# Patient Record
Sex: FEMALE | Race: BLACK OR AFRICAN AMERICAN | NOT HISPANIC OR LATINO | Employment: UNEMPLOYED | URBAN - METROPOLITAN AREA
[De-identification: names, ages, dates, MRNs, and addresses within clinical notes are randomized per-mention and may not be internally consistent; named-entity substitution may affect disease eponyms.]

---

## 2017-02-17 ENCOUNTER — OFFICE VISIT - HEALTHEAST (OUTPATIENT)
Dept: FAMILY MEDICINE | Facility: CLINIC | Age: 47
End: 2017-02-17

## 2017-02-17 DIAGNOSIS — R70.0 ELEVATED SED RATE: ICD-10-CM

## 2017-02-17 DIAGNOSIS — M79.10 MYALGIA: ICD-10-CM

## 2017-02-17 DIAGNOSIS — E55.9 VITAMIN D DEFICIENCY: ICD-10-CM

## 2017-02-17 DIAGNOSIS — D50.9 IRON DEFICIENCY ANEMIA: ICD-10-CM

## 2017-02-17 ASSESSMENT — MIFFLIN-ST. JEOR: SCORE: 1386.55

## 2017-02-21 LAB — ANA SER QL: 0.7 U

## 2017-02-23 ENCOUNTER — AMBULATORY - HEALTHEAST (OUTPATIENT)
Dept: FAMILY MEDICINE | Facility: CLINIC | Age: 47
End: 2017-02-23

## 2017-02-23 DIAGNOSIS — E55.9 VITAMIN D DEFICIENCY: ICD-10-CM

## 2017-04-18 ENCOUNTER — OFFICE VISIT - HEALTHEAST (OUTPATIENT)
Dept: FAMILY MEDICINE | Facility: CLINIC | Age: 47
End: 2017-04-18

## 2017-04-18 DIAGNOSIS — M54.2 NECK PAIN: ICD-10-CM

## 2017-04-18 DIAGNOSIS — E55.9 VITAMIN D DEFICIENCY: ICD-10-CM

## 2017-04-18 DIAGNOSIS — D50.9 IRON DEFICIENCY ANEMIA: ICD-10-CM

## 2017-04-18 DIAGNOSIS — M35.3 POLYMYALGIA (H): ICD-10-CM

## 2017-04-18 DIAGNOSIS — G44.209 ACUTE NON INTRACTABLE TENSION-TYPE HEADACHE: ICD-10-CM

## 2017-04-26 ENCOUNTER — COMMUNICATION - HEALTHEAST (OUTPATIENT)
Dept: FAMILY MEDICINE | Facility: CLINIC | Age: 47
End: 2017-04-26

## 2017-04-26 DIAGNOSIS — E55.9 VITAMIN D DEFICIENCY: ICD-10-CM

## 2017-04-27 ENCOUNTER — COMMUNICATION - HEALTHEAST (OUTPATIENT)
Dept: LAB | Facility: CLINIC | Age: 47
End: 2017-04-27

## 2017-04-28 ENCOUNTER — AMBULATORY - HEALTHEAST (OUTPATIENT)
Dept: FAMILY MEDICINE | Facility: CLINIC | Age: 47
End: 2017-04-28

## 2017-04-28 ENCOUNTER — AMBULATORY - HEALTHEAST (OUTPATIENT)
Dept: LAB | Facility: CLINIC | Age: 47
End: 2017-04-28

## 2017-04-28 DIAGNOSIS — E55.9 VITAMIN D DEFICIENCY: ICD-10-CM

## 2017-06-07 DIAGNOSIS — M25.532 LEFT WRIST PAIN: ICD-10-CM

## 2017-06-07 NOTE — TELEPHONE ENCOUNTER
Request for medication refill:    Date of last visit at clinic: 12/23/2015    Please complete refill if appropriate and CLOSE ENCOUNTER.    Closing the encounter signifies the refill is complete.    If refill has been denied, please complete the smart phrase .smirefuse and route it to the [facility] RN TRIAGE pool to inform the patient and the pharmacy.    Malka Rust MA

## 2017-06-13 DIAGNOSIS — K21.9 GASTROESOPHAGEAL REFLUX DISEASE WITHOUT ESOPHAGITIS: ICD-10-CM

## 2017-06-13 RX ORDER — OMEPRAZOLE 40 MG/1
40 CAPSULE, DELAYED RELEASE ORAL DAILY
Qty: 30 CAPSULE | Refills: 0 | Status: SHIPPED | OUTPATIENT
Start: 2017-06-13 | End: 2022-09-30

## 2017-06-13 NOTE — TELEPHONE ENCOUNTER
Request for medication refill:    Date of last visit at clinic: 12/18/15    Please complete refill if appropriate and CLOSE ENCOUNTER.    Closing the encounter signifies the refill is complete.    If refill has been denied, please complete the smart phrase .smirefuse and route it to the [facility] RN TRIAGE pool to inform the patient and the pharmacy.    Lizette Lamas CMA      12/18/15

## 2017-06-14 ENCOUNTER — COMMUNICATION - HEALTHEAST (OUTPATIENT)
Dept: FAMILY MEDICINE | Facility: CLINIC | Age: 47
End: 2017-06-14

## 2017-06-14 DIAGNOSIS — K21.9 GASTROESOPHAGEAL REFLUX DISEASE WITHOUT ESOPHAGITIS: ICD-10-CM

## 2017-07-21 ENCOUNTER — COMMUNICATION - HEALTHEAST (OUTPATIENT)
Dept: SCHEDULING | Facility: CLINIC | Age: 47
End: 2017-07-21

## 2017-07-21 ENCOUNTER — OFFICE VISIT - HEALTHEAST (OUTPATIENT)
Dept: FAMILY MEDICINE | Facility: CLINIC | Age: 47
End: 2017-07-21

## 2017-07-21 DIAGNOSIS — K21.9 GASTROESOPHAGEAL REFLUX DISEASE WITHOUT ESOPHAGITIS: ICD-10-CM

## 2017-07-21 DIAGNOSIS — M54.9 BACK PAIN: ICD-10-CM

## 2017-07-21 DIAGNOSIS — N39.0 UTI (URINARY TRACT INFECTION): ICD-10-CM

## 2017-12-22 ENCOUNTER — OFFICE VISIT (OUTPATIENT)
Dept: FAMILY MEDICINE | Facility: CLINIC | Age: 47
End: 2017-12-22
Payer: COMMERCIAL

## 2017-12-22 VITALS
SYSTOLIC BLOOD PRESSURE: 102 MMHG | TEMPERATURE: 97.6 F | WEIGHT: 159 LBS | DIASTOLIC BLOOD PRESSURE: 60 MMHG | RESPIRATION RATE: 18 BRPM | HEART RATE: 50 BPM | OXYGEN SATURATION: 100 % | HEIGHT: 62 IN | BODY MASS INDEX: 29.26 KG/M2

## 2017-12-22 DIAGNOSIS — E78.5 HYPERLIPIDEMIA LDL GOAL <100: ICD-10-CM

## 2017-12-22 DIAGNOSIS — D22.9 NEVUS: ICD-10-CM

## 2017-12-22 DIAGNOSIS — E55.9 VITAMIN D DEFICIENCY: Primary | ICD-10-CM

## 2017-12-22 DIAGNOSIS — R63.4 LOSS OF WEIGHT: ICD-10-CM

## 2017-12-22 LAB
CHOLEST SERPL-MCNC: 173.2 MG/DL (ref 0–200)
CHOLEST/HDLC SERPL: 3.2 {RATIO} (ref 0–5)
HDLC SERPL-MCNC: 54.9 MG/DL
HEMOGLOBIN: 13.5 G/DL (ref 11.7–15.7)
LDLC SERPL CALC-MCNC: 110 MG/DL (ref 0–129)
TRIGL SERPL-MCNC: 41.5 MG/DL (ref 0–150)
VLDL CHOLESTEROL: 8.3 MG/DL (ref 7–32)

## 2017-12-22 RX ORDER — MULTIVITAMIN,THERAPEUTIC
1 TABLET ORAL DAILY
COMMUNITY
End: 2019-04-19

## 2017-12-22 NOTE — LETTER
December 24, 2017      Ana Cristina Mc  [address]   [facility] [address]        Dear Ana Cristina,    Thank you for getting your care at [facility]. Please see below for your test results.    Resulted Orders   Hemoglobin (HGB) (LabDAQ)   Result Value Ref Range    Hemoglobin 13.5 11.7 - 15.7 g/dL   Vitamin D Deficiency   Result Value Ref Range    Vitamin D Deficiency screening 14 (L) 20 - 75 ug/L      Comment:      Season, race, dietary intake, and treatment affect the concentration of   25-hydroxy-Vitamin D. Values may decrease during winter months and increase   during summer months. Values 20-29 ug/L may indicate Vitamin D insufficiency   and values <20 ug/L may indicate Vitamin D deficiency.  Vitamin D determination is routinely performed by an immunoassay specific for   25 hydroxyvitamin D3.  If an individual is on vitamin D2 (ergocalciferol)   supplementation, please specify 25 OH vitamin D2 and D3 level determination by   LCMSMS test VITD23.     Lipid Panel (LabDAQ)   Result Value Ref Range    Cholesterol 173.2 0.0 - 200.0 mg/dL    Cholesterol/HDL Ratio 3.2 0.0 - 5.0    HDL Cholesterol 54.9 >40.0 mg/dL    LDL Cholesterol Calculated 110 0 - 129 mg/dL    Triglycerides 41.5 0.0 - 150.0 mg/dL    VLDL Cholesterol 8.3 7.0 - 32.0 mg/dL     Your blood tests are good except your vitamin D level is low. I will send a prescription to your pharmacy for high dose vitamin D to take once a month for 4 months.    Sincerely,    Molly Garcia MD

## 2017-12-22 NOTE — PATIENT INSTRUCTIONS
[facility] [place], [place]  Address: [address], [place] [address]  Phone: [phone]

## 2017-12-22 NOTE — PROGRESS NOTES
"SUBJECTIVE:  The patient is here with 2 main issues.  One is that she has had a lesion on her nose for quite a few years; it has not changed specifically, but it is bothering her as far as often will get scratched.  She is interested in having that removed.  She also has ended up losing a lot of weight on purpose by getting regular exercise and eating better, but she is worried about whether she might have some problems with her vitamin levels.  She has been low on vitamin D in the past.  She is wondering about her iron levels, too.  She has had a history of hyperlipidemia.  She is not fasting, but I told her we can check that now even though she is not fasting.  She is also wondering what a healthy weight would be.      OBJECTIVE:  On exam, the patient is in no acute distress.  Vital signs are stable.  She does have a raised, almost papular-looking lesion on her nose, uniform color.  She has lost an amazing amount of weight on our records over the last 10 years or so.  The heights were all over the place, so we got an accurate height.  She seems to be 5'1\", so her BMI is just at 30 now.  We talked about the weight that would get it to below 25.  But I congratulated her on the weight loss she had done which was fantastic.      IMPRESSION:  Papule on the nose.  Dramatic weight loss on purpose.      PLAN:  We will do a Derm referral for better cosmetic results on the nose lesion.  Check a vitamin D, hemoglobin and a lipid profile.  She also asked about that she has lots of excess skin now from all this weight loss.  We talked about that surgery can be done on that, but it probably would be best to get her to whatever her stable weight will be before pursuing that.      Visit length 25 minutes, more than 50% spent in counseling about her skin lesion and weight loss.      Molly Khanna MD       "

## 2017-12-22 NOTE — MR AVS SNAPSHOT
After Visit Summary   12/22/2017    Ana Cristina Mc    MRN: [id number]           Patient Information     Date Of Birth          1970        Visit Information        Provider Department      12/22/2017 11:00 AM Molly Garcia MD [facility]        Today's Diagnoses     Vitamin D deficiency    -  1    Hyperlipidemia LDL goal <100        Loss of weight        Nevus          Care Instructions    [facility] [place], [place]  Address: [facility] [address], [place] [address]  Phone: [phone]          Follow-ups after your visit        Additional Services     DERMATOLOGY REFERRAL - EXTERNAL       [facility]   [address] [facility] [address]  [phone]    For removal of nevus on nose      At this location - prefer [facility] if possible                  Who to contact     Please call your clinic at [phone] to:    Ask questions about your health    Make or cancel appointments    Discuss your medicines    Learn about your test results    Speak to your doctor   If you have compliments or concerns about an experience at your clinic, or if you wish to file a complaint, please contact [facility] Patient Relations at [phone] or email us at [email or web address]         Additional Information About Your Visit        MyChart Information     Wahandat is an electronic gateway that provides easy, online access to your medical records. With My-Apps, you can request a clinic appointment, read your test results, renew a prescription or communicate with your care team.     To sign up for Wahandat visit the website at [email or web address]   You will be asked to enter the access code listed below, as well as some personal information. Please follow the directions to create your username and password.     Your access code is: [email or web address]  Expires: 3/22/2018 11:32 AM     Your access code  "will  in 90 days. If you need help or a new code, please contact your [facility] or call [phone] for assistance.        Care EveryWhere ID     This is your Care EveryWhere ID. This could be used by other organizations to access your [place] medical records  [id number]        Your Vitals Were     Pulse Temperature Respirations Height Last Period Pulse Oximetry    50 97.6  F (36.4  C) (Oral) 18 5' 1.5\" (156.2 cm) 2017 100%    Breastfeeding? BMI (Body Mass Index)                No 29.56 kg/m2           Blood Pressure from Last 3 Encounters:   17 102/60   12/23/15 107/72   12/18/15 142/83    Weight from Last 3 Encounters:   17 159 lb (72.1 kg)   12/23/15 188 lb 9.6 oz (85.5 kg)   12/18/15 186 lb 3.2 oz (84.5 kg)              We Performed the Following     DERMATOLOGY REFERRAL - EXTERNAL     Hemoglobin (HGB) (LabDAQ)     Lipid Panel (LabDAQ)     Vitamin D Deficiency        Primary Care Provider Office Phone # Fax #    Molly Garcia [phone]       [address] [address]        Equal Access to Services     CARLEEN PRIETO : Fransisco martinezo Soavery, waaxda luqadaha, qaybta kaalmada adeegyada, destinee tang. So [facility] [phone].    ATENCIÓN: Si habla español, tiene a avina disposición servicios gratuitos de asistencia lingüística. yunier al [phone].    We comply with applicable federal civil rights laws and [place] laws. We do not discriminate on the basis of race, color, national origin, age, disability, sex, sexual orientation, or gender identity.            Thank you!     Thank you for choosing [facility]  for your care. Our goal is always to provide you with excellent care. Hearing back from our patients is one way we can continue to improve our services. Please take a few minutes to complete the written survey that you may receive in the mail after " your visit with us. Thank you!             Your Updated Medication List - Protect others around you: Learn how to safely use, store and throw away your medicines at www.disposemymeds.org.          This list is accurate as of: 12/22/17 11:41 AM.  Always use your most recent med list.                   Brand Name Dispense Instructions for use Diagnosis    acetaminophen 500 MG tablet    TYLENOL    100 tablet    Take 2 tablets (1,000 mg) by mouth every 6 hours as needed for pain    Bilateral low back pain without sciatica       cyclobenzaprine 10 MG tablet    FLEXERIL    7 tablet    Take 1 tablet (10 mg) by mouth nightly as needed for muscle spasms    Costochondral chest pain, Sprain and strain of shoulder and upper arm, left, initial encounter       ibuprofen 800 MG tablet    ADVIL/MOTRIN    30 tablet    Take 1 tablet (800 mg) by mouth every 8 hours as needed for moderate pain    Left shoulder pain       multivitamin, therapeutic Tabs tablet      Take 1 tablet by mouth daily        naproxen 500 MG tablet    NAPROSYN    60 tablet    Take 1 tablet (500 mg) by mouth 2 times daily (with meals)    Costochondral chest pain       omeprazole 40 MG capsule    priLOSEC    30 capsule    Take 1 capsule (40 mg) by mouth daily Take 30-60 minutes before a meal. No further refills until seen in clinic.    Gastroesophageal reflux disease without esophagitis       order for DME     1 Device    Equipment being ordered: wrist brace    Left wrist pain       vitamin D 28066 UNIT capsule    ERGOCALCIFEROL    16 capsule    Take 1 capsule (50,000 Units) by mouth every 7 days    Vitamin D deficiency

## 2017-12-23 LAB — DEPRECATED CALCIDIOL+CALCIFEROL SERPL-MC: 14 UG/L (ref 20–75)

## 2017-12-24 DIAGNOSIS — E55.9 VITAMIN D DEFICIENCY: ICD-10-CM

## 2017-12-24 RX ORDER — ERGOCALCIFEROL 1.25 MG/1
50000 CAPSULE, LIQUID FILLED ORAL
Qty: 16 CAPSULE | Refills: 0 | Status: SHIPPED | OUTPATIENT
Start: 2017-12-24 | End: 2018-08-28

## 2018-01-12 DIAGNOSIS — E55.9 VITAMIN D DEFICIENCY: Primary | ICD-10-CM

## 2018-02-27 ENCOUNTER — OFFICE VISIT - HEALTHEAST (OUTPATIENT)
Dept: FAMILY MEDICINE | Facility: CLINIC | Age: 48
End: 2018-02-27

## 2018-02-27 DIAGNOSIS — R10.11 RIGHT UPPER QUADRANT ABDOMINAL PAIN: ICD-10-CM

## 2018-02-27 LAB
ALBUMIN SERPL-MCNC: 3.4 G/DL (ref 3.5–5)
ALBUMIN UR-MCNC: NEGATIVE MG/DL
ALP SERPL-CCNC: 55 U/L (ref 45–120)
ALT SERPL W P-5'-P-CCNC: 14 U/L (ref 0–45)
ANION GAP SERPL CALCULATED.3IONS-SCNC: 6 MMOL/L (ref 5–18)
APPEARANCE UR: CLEAR
AST SERPL W P-5'-P-CCNC: 20 U/L (ref 0–40)
BACTERIA #/AREA URNS HPF: ABNORMAL HPF
BILIRUB SERPL-MCNC: 0.8 MG/DL (ref 0–1)
BILIRUB UR QL STRIP: NEGATIVE
BUN SERPL-MCNC: 10 MG/DL (ref 8–22)
CALCIUM SERPL-MCNC: 9.4 MG/DL (ref 8.5–10.5)
CHLORIDE BLD-SCNC: 103 MMOL/L (ref 98–107)
CO2 SERPL-SCNC: 30 MMOL/L (ref 22–31)
COLOR UR AUTO: YELLOW
CREAT SERPL-MCNC: 0.53 MG/DL (ref 0.6–1.1)
ERYTHROCYTE [DISTWIDTH] IN BLOOD BY AUTOMATED COUNT: 12 % (ref 11–14.5)
GFR SERPL CREATININE-BSD FRML MDRD: >60 ML/MIN/1.73M2
GLUCOSE BLD-MCNC: 92 MG/DL (ref 70–125)
GLUCOSE UR STRIP-MCNC: NEGATIVE MG/DL
HCT VFR BLD AUTO: 36.3 % (ref 35–47)
HGB BLD-MCNC: 12.1 G/DL (ref 12–16)
HGB UR QL STRIP: NEGATIVE
KETONES UR STRIP-MCNC: NEGATIVE MG/DL
LEUKOCYTE ESTERASE UR QL STRIP: ABNORMAL
LIPASE SERPL-CCNC: 25 U/L (ref 0–52)
MCH RBC QN AUTO: 31.9 PG (ref 27–34)
MCHC RBC AUTO-ENTMCNC: 33.2 G/DL (ref 32–36)
MCV RBC AUTO: 96 FL (ref 80–100)
NITRATE UR QL: NEGATIVE
PH UR STRIP: 5.5 [PH] (ref 5–8)
PLATELET # BLD AUTO: 190 THOU/UL (ref 140–440)
PMV BLD AUTO: 8.8 FL (ref 7–10)
POTASSIUM BLD-SCNC: 4.3 MMOL/L (ref 3.5–5)
PROT SERPL-MCNC: 6.3 G/DL (ref 6–8)
RBC # BLD AUTO: 3.78 MILL/UL (ref 3.8–5.4)
RBC #/AREA URNS AUTO: ABNORMAL HPF
SODIUM SERPL-SCNC: 139 MMOL/L (ref 136–145)
SP GR UR STRIP: <=1.005 (ref 1–1.03)
SQUAMOUS #/AREA URNS AUTO: ABNORMAL LPF
UROBILINOGEN UR STRIP-ACNC: ABNORMAL
WBC #/AREA URNS AUTO: ABNORMAL HPF
WBC: 5 THOU/UL (ref 4–11)

## 2018-02-27 ASSESSMENT — MIFFLIN-ST. JEOR: SCORE: 1360.24

## 2018-02-28 LAB — BACTERIA SPEC CULT: NO GROWTH

## 2018-03-02 ENCOUNTER — HOSPITAL ENCOUNTER (OUTPATIENT)
Dept: ULTRASOUND IMAGING | Facility: CLINIC | Age: 48
Discharge: HOME | End: 2018-03-02
Attending: NURSE PRACTITIONER

## 2018-03-02 DIAGNOSIS — R10.11 RIGHT UPPER QUADRANT ABDOMINAL PAIN: ICD-10-CM

## 2018-03-03 ENCOUNTER — HEALTH MAINTENANCE LETTER (OUTPATIENT)
Age: 48
End: 2018-03-03

## 2018-03-03 ENCOUNTER — AMBULATORY - HEALTHEAST (OUTPATIENT)
Dept: FAMILY MEDICINE | Facility: CLINIC | Age: 48
End: 2018-03-03

## 2018-03-03 DIAGNOSIS — K80.20 CALCULUS OF GALLBLADDER WITHOUT CHOLECYSTITIS WITHOUT OBSTRUCTION: ICD-10-CM

## 2018-03-15 ENCOUNTER — OFFICE VISIT - HEALTHEAST (OUTPATIENT)
Dept: SURGERY | Facility: CLINIC | Age: 48
End: 2018-03-15

## 2018-03-15 DIAGNOSIS — K80.20 CALCULUS OF GALLBLADDER WITHOUT CHOLECYSTITIS WITHOUT OBSTRUCTION: ICD-10-CM

## 2018-03-15 ASSESSMENT — MIFFLIN-ST. JEOR: SCORE: 1305.58

## 2018-03-20 ENCOUNTER — OFFICE VISIT - HEALTHEAST (OUTPATIENT)
Dept: FAMILY MEDICINE | Facility: CLINIC | Age: 48
End: 2018-03-20

## 2018-03-20 DIAGNOSIS — M35.3 POLYMYALGIA (H): ICD-10-CM

## 2018-03-20 DIAGNOSIS — H54.7 DECREASED VISUAL ACUITY: ICD-10-CM

## 2018-03-20 DIAGNOSIS — Z12.31 VISIT FOR SCREENING MAMMOGRAM: ICD-10-CM

## 2018-03-20 DIAGNOSIS — R53.83 FATIGUE, UNSPECIFIED TYPE: ICD-10-CM

## 2018-03-20 DIAGNOSIS — G89.29 CHRONIC NONINTRACTABLE HEADACHE, UNSPECIFIED HEADACHE TYPE: ICD-10-CM

## 2018-03-20 DIAGNOSIS — R51.9 CHRONIC NONINTRACTABLE HEADACHE, UNSPECIFIED HEADACHE TYPE: ICD-10-CM

## 2018-03-20 LAB
CK SERPL-CCNC: 91 U/L (ref 30–190)
ERYTHROCYTE [DISTWIDTH] IN BLOOD BY AUTOMATED COUNT: 12 % (ref 11–14.5)
ERYTHROCYTE [SEDIMENTATION RATE] IN BLOOD BY WESTERGREN METHOD: 11 MM/HR (ref 0–20)
FERRITIN SERPL-MCNC: 13 NG/ML (ref 10–130)
HBA1C MFR BLD: 5.3 % (ref 3.5–6)
HCT VFR BLD AUTO: 39.8 % (ref 35–47)
HGB BLD-MCNC: 13.2 G/DL (ref 12–16)
IRON SATN MFR SERPL: 18 % (ref 20–50)
IRON SERPL-MCNC: 58 UG/DL (ref 42–175)
MCH RBC QN AUTO: 31.8 PG (ref 27–34)
MCHC RBC AUTO-ENTMCNC: 33.1 G/DL (ref 32–36)
MCV RBC AUTO: 96 FL (ref 80–100)
PLATELET # BLD AUTO: 208 THOU/UL (ref 140–440)
PMV BLD AUTO: 8.3 FL (ref 7–10)
RBC # BLD AUTO: 4.15 MILL/UL (ref 3.8–5.4)
T4 FREE SERPL-MCNC: 1 NG/DL (ref 0.7–1.8)
TIBC SERPL-MCNC: 318 UG/DL (ref 313–563)
TRANSFERRIN SERPL-MCNC: 254 MG/DL (ref 212–360)
TSH SERPL DL<=0.005 MIU/L-ACNC: 0.63 UIU/ML (ref 0.3–5)
VIT B12 SERPL-MCNC: 288 PG/ML (ref 213–816)
WBC: 4.3 THOU/UL (ref 4–11)

## 2018-03-20 ASSESSMENT — MIFFLIN-ST. JEOR: SCORE: 1313.75

## 2018-03-21 LAB
25(OH)D3 SERPL-MCNC: 38 NG/ML (ref 30–80)
25(OH)D3 SERPL-MCNC: 38 NG/ML (ref 30–80)

## 2018-03-23 ENCOUNTER — TRANSFERRED RECORDS (OUTPATIENT)
Dept: HEALTH INFORMATION MANAGEMENT | Facility: CLINIC | Age: 48
End: 2018-03-23

## 2018-03-28 ENCOUNTER — HOSPITAL ENCOUNTER (OUTPATIENT)
Dept: MRI IMAGING | Facility: CLINIC | Age: 48
Discharge: HOME | End: 2018-03-28
Attending: NURSE PRACTITIONER

## 2018-03-28 ENCOUNTER — COMMUNICATION - HEALTHEAST (OUTPATIENT)
Dept: FAMILY MEDICINE | Facility: CLINIC | Age: 48
End: 2018-03-28

## 2018-03-28 DIAGNOSIS — G89.29 CHRONIC NONINTRACTABLE HEADACHE, UNSPECIFIED HEADACHE TYPE: ICD-10-CM

## 2018-03-28 DIAGNOSIS — R51.9 CHRONIC NONINTRACTABLE HEADACHE, UNSPECIFIED HEADACHE TYPE: ICD-10-CM

## 2018-04-10 ENCOUNTER — AMBULATORY - HEALTHEAST (OUTPATIENT)
Dept: FAMILY MEDICINE | Facility: CLINIC | Age: 48
End: 2018-04-10

## 2018-04-10 DIAGNOSIS — R51.9 ACUTE NONINTRACTABLE HEADACHE, UNSPECIFIED HEADACHE TYPE: ICD-10-CM

## 2018-04-13 ENCOUNTER — HOSPITAL ENCOUNTER (OUTPATIENT)
Dept: MAMMOGRAPHY | Facility: CLINIC | Age: 48
Discharge: HOME | End: 2018-04-13
Attending: NURSE PRACTITIONER

## 2018-04-13 DIAGNOSIS — Z12.31 VISIT FOR SCREENING MAMMOGRAM: ICD-10-CM

## 2018-07-06 ENCOUNTER — RECORDS - HEALTHEAST (OUTPATIENT)
Dept: ADMINISTRATIVE | Facility: OTHER | Age: 48
End: 2018-07-06

## 2018-08-26 ENCOUNTER — OFFICE VISIT - HEALTHEAST (OUTPATIENT)
Dept: FAMILY MEDICINE | Facility: CLINIC | Age: 48
End: 2018-08-26

## 2018-08-26 DIAGNOSIS — R10.9 ABDOMINAL PAIN: ICD-10-CM

## 2018-08-26 DIAGNOSIS — R35.0 URINARY FREQUENCY: ICD-10-CM

## 2018-08-26 LAB
ALBUMIN UR-MCNC: NEGATIVE MG/DL
ANION GAP SERPL CALCULATED.3IONS-SCNC: 10 MMOL/L (ref 5–18)
APPEARANCE UR: CLEAR
BILIRUB UR QL STRIP: NEGATIVE
BUN SERPL-MCNC: 5 MG/DL (ref 8–22)
CHLORIDE BLD-SCNC: 103 MMOL/L (ref 98–107)
CO2 SERPL-SCNC: 27 MMOL/L (ref 22–31)
COLOR UR AUTO: YELLOW
CREAT SERPL-MCNC: 0.5 MG/DL (ref 0.7–1.3)
ERYTHROCYTE [DISTWIDTH] IN BLOOD BY AUTOMATED COUNT: 11.5 % (ref 11–14.5)
GLUCOSE BLD-MCNC: 92 MG/DL (ref 70–125)
GLUCOSE UR STRIP-MCNC: NEGATIVE MG/DL
HCT VFR BLD AUTO: 37.5 % (ref 35–47)
HGB BLD-MCNC: 12.7 G/DL (ref 12–16)
HGB UR QL STRIP: NEGATIVE
KETONES UR STRIP-MCNC: NEGATIVE MG/DL
LEUKOCYTE ESTERASE UR QL STRIP: NEGATIVE
MCH RBC QN AUTO: 31.7 PG (ref 27–34)
MCHC RBC AUTO-ENTMCNC: 33.9 G/DL (ref 32–36)
MCV RBC AUTO: 94 FL (ref 80–100)
NITRATE UR QL: NEGATIVE
PH UR STRIP: 6 [PH] (ref 5–8)
PLATELET # BLD AUTO: 212 THOU/UL (ref 140–440)
PMV BLD AUTO: 8.5 FL (ref 7–10)
POTASSIUM BLD-SCNC: 4.3 MMOL/L (ref 3.5–5.5)
RBC # BLD AUTO: 4 MILL/UL (ref 3.8–5.4)
SODIUM SERPL-SCNC: 140 MMOL/L (ref 136–145)
SP GR UR STRIP: <=1.005 (ref 1–1.03)
UROBILINOGEN UR STRIP-ACNC: NORMAL
WBC: 4.2 THOU/UL (ref 4–11)

## 2018-08-28 ENCOUNTER — OFFICE VISIT (OUTPATIENT)
Dept: FAMILY MEDICINE | Facility: CLINIC | Age: 48
End: 2018-08-28
Payer: COMMERCIAL

## 2018-08-28 VITALS
RESPIRATION RATE: 12 BRPM | DIASTOLIC BLOOD PRESSURE: 65 MMHG | BODY MASS INDEX: 31.27 KG/M2 | SYSTOLIC BLOOD PRESSURE: 102 MMHG | HEART RATE: 67 BPM | WEIGHT: 168.2 LBS | OXYGEN SATURATION: 100 %

## 2018-08-28 DIAGNOSIS — E55.9 VITAMIN D DEFICIENCY: ICD-10-CM

## 2018-08-28 LAB — DEPRECATED CALCIDIOL+CALCIFEROL SERPL-MC: 21 UG/L (ref 20–75)

## 2018-08-28 NOTE — LETTER
August 29, 2018      Ana Cristina Mc  [address]   [facility] [address]        Dear Ana Cristina,    Thank you for getting your care at [facility]. Please see below for your test results.    Resulted Orders   Vitamin D Deficiency   Result Value Ref Range    Vitamin D Deficiency screening 21 20 - 75 ug/L      Comment:      Season, race, dietary intake, and treatment affect the concentration of   25-hydroxy-Vitamin D. Values may decrease during winter months and increase   during summer months. Values 20-29 ug/L may indicate Vitamin D insufficiency   and values <20 ug/L may indicate Vitamin D deficiency.  Vitamin D determination is routinely performed by an immunoassay specific for   25 hydroxyvitamin D3.  If an individual is on vitamin D2 (ergocalciferol)   supplementation, please specify 25 OH vitamin D2 and D3 level determination by   LCMSMS test VITD23.       Your vitamin D level is improved but still low. You should definitely take the last 4 weeks of the high dose vitamin D pills.    Sincerely,    Molly Garcia MD

## 2018-08-28 NOTE — MR AVS SNAPSHOT
After Visit Summary   8/28/2018    Ana Cristina Mc    MRN: [id number]           Patient Information     Date Of Birth          1970        Visit Information        Provider Department      8/28/2018 10:40 AM Molly Garcia MD [facility]        Today's Diagnoses     Vitamin D deficiency           Follow-ups after your visit        Who to contact     Please call your clinic at [phone] to:    Ask questions about your health    Make or cancel appointments    Discuss your medicines    Learn about your test results    Speak to your doctor            Additional Information About Your Visit        MyChart Information     WaveSyndicate gives you secure access to your electronic health record. If you see a primary care provider, you can also send messages to your care team and make appointments. If you have questions, please call your primary care clinic.  If you do not have a primary care provider, please call [phone] and they will assist you.      WaveSyndicate is an electronic gateway that provides easy, online access to your medical records. With WaveSyndicate, you can request a clinic appointment, read your test results, renew a prescription or communicate with your care team.     To access your existing account, please contact your [facility] or call [phone] for assistance.        Care EveryWhere ID     This is your Care EveryWhere ID. This could be used by other organizations to access your [place] medical records  [id number]        Your Vitals Were     Pulse Respirations Pulse Oximetry BMI (Body Mass Index)          67 12 100% 31.27 kg/m2         Blood Pressure from Last 3 Encounters:   08/28/18 102/65   12/22/17 102/60   12/23/15 107/72    Weight from Last 3 Encounters:   08/28/18 168 lb 3.2 oz (76.3 kg)   12/22/17 159 lb (72.1 kg)   12/23/15 188 lb 9.6 oz (85.5 kg)              We Performed the Following     Vitamin D Deficiency           Today's Medication Changes          These changes are accurate as of 8/28/18 10:54 AM.  If you have any questions, ask your nurse or doctor.               Stop taking these medicines if you haven't already. Please contact your care team if you have questions.     vitamin D 03925 UNIT capsule   Commonly known as:  ERGOCALCIFEROL   Stopped by:  Molly Garcia MD                Where to get your medicines      These medications were sent to [facility] [address] [place] [address] [address] [place] [address]  [address] [facility] [address]     Phone:  [phone]     Vitamin D3 28886 units Tabs                Primary Care Provider Office Phone # Fax #    Molly Garcia [phone]       [address] [address]        Equal Access to Services     [facility]: Hadii michelle nuñez hadasho Soomaali, waaxda luqadaha, qaybta kaalmada adeegyada, destinee kelley hayjuan tabor . So [facility] [phone].    ATENCIÓN: Si habla español, tiene a avina disposición servicios gratuitos de asistencia lingüística. Barb al [phone].    We comply with applicable federal civil rights laws and [place] laws. We do not discriminate on the basis of race, color, national origin, age, disability, sex, sexual orientation, or gender identity.            Thank you!     Thank you for choosing [facility]  for your care. Our goal is always to provide you with excellent care. Hearing back from our patients is one way we can continue to improve our services. Please take a few minutes to complete the written survey that you may receive in the mail after your visit with us. Thank you!             Your Updated Medication List - Protect others around you: Learn how to safely use, store and throw away your medicines at www.disposemymeds.org.          This list is accurate as of 8/28/18 10:54 AM.  Always use your most recent med list.                    Brand Name Dispense Instructions for use Diagnosis    acetaminophen 500 MG tablet    TYLENOL    100 tablet    Take 2 tablets (1,000 mg) by mouth every 6 hours as needed for pain    Bilateral low back pain without sciatica       cyclobenzaprine 10 MG tablet    FLEXERIL    7 tablet    Take 1 tablet (10 mg) by mouth nightly as needed for muscle spasms    Costochondral chest pain, Sprain and strain of shoulder and upper arm, left, initial encounter       ibuprofen 800 MG tablet    ADVIL/MOTRIN    30 tablet    Take 1 tablet (800 mg) by mouth every 8 hours as needed for moderate pain    Left shoulder pain       multivitamin, therapeutic Tabs tablet      Take 1 tablet by mouth daily        naproxen 500 MG tablet    NAPROSYN    60 tablet    Take 1 tablet (500 mg) by mouth 2 times daily (with meals)    Costochondral chest pain       omeprazole 40 MG capsule    priLOSEC    30 capsule    Take 1 capsule (40 mg) by mouth daily Take 30-60 minutes before a meal. No further refills until seen in clinic.    Gastroesophageal reflux disease without esophagitis       order for DME     1 Device    Equipment being ordered: wrist brace    Left wrist pain       Vitamin D3 61532 units Tabs     4 tablet    Take 1 tablet by mouth once a week    Vitamin D deficiency

## 2018-09-18 ENCOUNTER — OFFICE VISIT - HEALTHEAST (OUTPATIENT)
Dept: FAMILY MEDICINE | Facility: CLINIC | Age: 48
End: 2018-09-18

## 2018-09-18 DIAGNOSIS — M53.3 COCCYDYNIA: ICD-10-CM

## 2018-09-18 DIAGNOSIS — M54.41 ACUTE MIDLINE LOW BACK PAIN WITH RIGHT-SIDED SCIATICA: ICD-10-CM

## 2018-10-15 ENCOUNTER — OFFICE VISIT - HEALTHEAST (OUTPATIENT)
Dept: FAMILY MEDICINE | Facility: CLINIC | Age: 48
End: 2018-10-15

## 2018-10-15 DIAGNOSIS — J06.9 URI (UPPER RESPIRATORY INFECTION): ICD-10-CM

## 2018-10-17 ENCOUNTER — COMMUNICATION - HEALTHEAST (OUTPATIENT)
Dept: ADMINISTRATIVE | Facility: CLINIC | Age: 48
End: 2018-10-17

## 2018-11-09 ENCOUNTER — OFFICE VISIT - HEALTHEAST (OUTPATIENT)
Dept: FAMILY MEDICINE | Facility: CLINIC | Age: 48
End: 2018-11-09

## 2018-11-09 DIAGNOSIS — G89.29 CHRONIC MIDLINE LOW BACK PAIN WITHOUT SCIATICA: ICD-10-CM

## 2018-11-09 DIAGNOSIS — M54.50 CHRONIC MIDLINE LOW BACK PAIN WITHOUT SCIATICA: ICD-10-CM

## 2018-11-09 ASSESSMENT — MIFFLIN-ST. JEOR: SCORE: 1409

## 2018-11-13 ENCOUNTER — HOSPITAL ENCOUNTER (OUTPATIENT)
Dept: MRI IMAGING | Facility: CLINIC | Age: 48
Discharge: HOME | End: 2018-11-13

## 2018-11-13 DIAGNOSIS — M54.50 CHRONIC MIDLINE LOW BACK PAIN WITHOUT SCIATICA: ICD-10-CM

## 2018-11-13 DIAGNOSIS — G89.29 CHRONIC MIDLINE LOW BACK PAIN WITHOUT SCIATICA: ICD-10-CM

## 2018-11-20 ENCOUNTER — HOSPITAL ENCOUNTER (OUTPATIENT)
Dept: MRI IMAGING | Facility: CLINIC | Age: 48
Discharge: HOME | End: 2018-11-20

## 2018-12-11 ENCOUNTER — OFFICE VISIT - HEALTHEAST (OUTPATIENT)
Dept: FAMILY MEDICINE | Facility: CLINIC | Age: 48
End: 2018-12-11

## 2018-12-11 DIAGNOSIS — G89.29 CHRONIC MIDLINE LOW BACK PAIN WITHOUT SCIATICA: ICD-10-CM

## 2018-12-11 DIAGNOSIS — M54.50 CHRONIC MIDLINE LOW BACK PAIN WITHOUT SCIATICA: ICD-10-CM

## 2018-12-11 DIAGNOSIS — Z71.2 ENCOUNTER TO DISCUSS TEST RESULTS: ICD-10-CM

## 2018-12-17 ENCOUNTER — HOSPITAL ENCOUNTER (OUTPATIENT)
Dept: PHYSICAL MEDICINE AND REHAB | Facility: CLINIC | Age: 48
Discharge: HOME | End: 2018-12-17
Attending: NURSE PRACTITIONER

## 2018-12-17 DIAGNOSIS — M54.50 CHRONIC BILATERAL LOW BACK PAIN WITHOUT SCIATICA: ICD-10-CM

## 2018-12-17 DIAGNOSIS — G89.29 CHRONIC BILATERAL LOW BACK PAIN WITHOUT SCIATICA: ICD-10-CM

## 2018-12-17 DIAGNOSIS — M51.369 BULGE OF LUMBAR DISC WITHOUT MYELOPATHY: ICD-10-CM

## 2018-12-17 DIAGNOSIS — M47.816 LUMBAR FACET ARTHROPATHY: ICD-10-CM

## 2018-12-17 DIAGNOSIS — M51.9 ANNULAR TEAR: ICD-10-CM

## 2018-12-21 ENCOUNTER — OFFICE VISIT - HEALTHEAST (OUTPATIENT)
Dept: FAMILY MEDICINE | Facility: CLINIC | Age: 48
End: 2018-12-21

## 2018-12-21 DIAGNOSIS — R42 DIZZINESS: ICD-10-CM

## 2018-12-21 DIAGNOSIS — Z86.2 HX OF IRON DEFICIENCY ANEMIA: ICD-10-CM

## 2018-12-21 DIAGNOSIS — E55.9 VITAMIN D DEFICIENCY: ICD-10-CM

## 2018-12-21 DIAGNOSIS — R53.83 FATIGUE, UNSPECIFIED TYPE: ICD-10-CM

## 2018-12-21 LAB
ANION GAP SERPL CALCULATED.3IONS-SCNC: 10 MMOL/L (ref 5–18)
BUN SERPL-MCNC: 9 MG/DL (ref 8–22)
CALCIUM SERPL-MCNC: 9.9 MG/DL (ref 8.5–10.5)
CHLORIDE BLD-SCNC: 104 MMOL/L (ref 98–107)
CO2 SERPL-SCNC: 25 MMOL/L (ref 22–31)
CREAT SERPL-MCNC: 0.63 MG/DL (ref 0.6–1.1)
GFR SERPL CREATININE-BSD FRML MDRD: >60 ML/MIN/1.73M2
GLUCOSE BLD-MCNC: 88 MG/DL (ref 70–125)
HGB BLD-MCNC: 12.5 G/DL (ref 12–16)
POTASSIUM BLD-SCNC: 4.3 MMOL/L (ref 3.5–5)
SODIUM SERPL-SCNC: 139 MMOL/L (ref 136–145)
TSH SERPL DL<=0.005 MIU/L-ACNC: 0.71 UIU/ML (ref 0.3–5)

## 2018-12-24 LAB
25(OH)D3 SERPL-MCNC: 25.4 NG/ML (ref 30–80)
25(OH)D3 SERPL-MCNC: 25.4 NG/ML (ref 30–80)

## 2019-04-05 ENCOUNTER — OFFICE VISIT - HEALTHEAST (OUTPATIENT)
Dept: SURGERY | Facility: CLINIC | Age: 49
End: 2019-04-05

## 2019-04-05 DIAGNOSIS — K80.50 BILIARY COLIC: ICD-10-CM

## 2019-04-15 ENCOUNTER — OFFICE VISIT - HEALTHEAST (OUTPATIENT)
Dept: FAMILY MEDICINE | Facility: CLINIC | Age: 49
End: 2019-04-15

## 2019-04-15 ENCOUNTER — AMBULATORY - HEALTHEAST (OUTPATIENT)
Dept: FAMILY MEDICINE | Facility: CLINIC | Age: 49
End: 2019-04-15

## 2019-04-15 DIAGNOSIS — E55.9 VITAMIN D DEFICIENCY: ICD-10-CM

## 2019-04-15 DIAGNOSIS — N92.0 MENORRHAGIA WITH REGULAR CYCLE: ICD-10-CM

## 2019-04-15 DIAGNOSIS — K21.9 GASTROESOPHAGEAL REFLUX DISEASE WITHOUT ESOPHAGITIS: ICD-10-CM

## 2019-04-15 DIAGNOSIS — M35.3 POLYMYALGIA (H): ICD-10-CM

## 2019-04-15 DIAGNOSIS — R53.83 FATIGUE, UNSPECIFIED TYPE: ICD-10-CM

## 2019-04-15 LAB
25(OH)D3 SERPL-MCNC: 20.4 NG/ML (ref 30–80)
ALBUMIN SERPL-MCNC: 3.6 G/DL (ref 3.5–5)
ALP SERPL-CCNC: 55 U/L (ref 45–120)
ALT SERPL W P-5'-P-CCNC: <9 U/L (ref 0–45)
ANION GAP SERPL CALCULATED.3IONS-SCNC: 7 MMOL/L (ref 5–18)
AST SERPL W P-5'-P-CCNC: 15 U/L (ref 0–40)
BILIRUB SERPL-MCNC: 0.6 MG/DL (ref 0–1)
BUN SERPL-MCNC: 9 MG/DL (ref 8–22)
CALCIUM SERPL-MCNC: 9.7 MG/DL (ref 8.5–10.5)
CHLORIDE BLD-SCNC: 107 MMOL/L (ref 98–107)
CO2 SERPL-SCNC: 26 MMOL/L (ref 22–31)
CREAT SERPL-MCNC: 0.67 MG/DL (ref 0.6–1.1)
ERYTHROCYTE [DISTWIDTH] IN BLOOD BY AUTOMATED COUNT: 13.6 % (ref 11–14.5)
ERYTHROCYTE [SEDIMENTATION RATE] IN BLOOD BY WESTERGREN METHOD: 25 MM/HR (ref 0–20)
GFR SERPL CREATININE-BSD FRML MDRD: >60 ML/MIN/1.73M2
GLUCOSE BLD-MCNC: 82 MG/DL (ref 70–125)
HCT VFR BLD AUTO: 39.1 % (ref 35–47)
HGB BLD-MCNC: 12.7 G/DL (ref 12–16)
MCH RBC QN AUTO: 29.9 PG (ref 27–34)
MCHC RBC AUTO-ENTMCNC: 32.5 G/DL (ref 32–36)
MCV RBC AUTO: 92 FL (ref 80–100)
PLATELET # BLD AUTO: 224 THOU/UL (ref 140–440)
PMV BLD AUTO: 9.2 FL (ref 7–10)
POTASSIUM BLD-SCNC: 4.7 MMOL/L (ref 3.5–5)
PROLACTIN SERPL-MCNC: 7.8 NG/ML (ref 0–20)
PROT SERPL-MCNC: 6.5 G/DL (ref 6–8)
RBC # BLD AUTO: 4.25 MILL/UL (ref 3.8–5.4)
SODIUM SERPL-SCNC: 140 MMOL/L (ref 136–145)
TSH SERPL DL<=0.005 MIU/L-ACNC: 0.73 UIU/ML (ref 0.3–5)
VIT B12 SERPL-MCNC: 270 PG/ML (ref 213–816)
WBC: 3.7 THOU/UL (ref 4–11)

## 2019-04-15 ASSESSMENT — MIFFLIN-ST. JEOR: SCORE: 1406.05

## 2019-04-16 ENCOUNTER — COMMUNICATION - HEALTHEAST (OUTPATIENT)
Dept: FAMILY MEDICINE | Facility: CLINIC | Age: 49
End: 2019-04-16

## 2019-04-18 ENCOUNTER — TELEPHONE (OUTPATIENT)
Dept: RHEUMATOLOGY | Facility: CLINIC | Age: 49
End: 2019-04-18

## 2019-04-18 NOTE — LETTER
May 1, 2019    Ana Cristina Mc  [address]  [facility] [address]      Dear Daphney Conde NP,  We want to thank you for your support of the Rheumatology Department at [facility]. We value your partnership, and will continue to do our best to serve you and your patients.  However, our new patient appointments are booked several months, and we receive more requests than we are able to accommodate. Our practice currently consists of six physicians, who are responsible for teaching and research commitments as well as patient care. While we are recruiting, there is a continued shortage of available rheumatologists. In order to provide high quality care to our current patients, we limit new patients according to the likelihood that we will be to provide additional tests or treatment options that are of benefit to the patient.  After reviewing the records, we have determined that your patient does not meet criteria for an appointment.  We appreciate your understanding, and ask that you discuss other options with your patient.  We value you as a referring provider, and hope that you will continue to consider us for your patients who require chronic management for conditions where immunosuppressive or immunomodulatory treatments are required or likely benefit.  Sincerely,  The Division of Arthritis and Autoimmune Disorders

## 2019-04-18 NOTE — TELEPHONE ENCOUNTER
[facility] Call Center    Phone Message    May a detailed message be left on voicemail: yes    Reason for Call: Other: Pt is being referred to Rheumatology by NP Daphney Conde at [facility] for Polymyalgia. Recs faxed to clinic for review. Thanks!     Action Taken: Message routed to:  [facility] ([facility] Rheumatology

## 2019-04-19 ENCOUNTER — OFFICE VISIT (OUTPATIENT)
Dept: FAMILY MEDICINE | Facility: CLINIC | Age: 49
End: 2019-04-19
Payer: MEDICAID

## 2019-04-19 VITALS
BODY MASS INDEX: 33.24 KG/M2 | SYSTOLIC BLOOD PRESSURE: 116 MMHG | DIASTOLIC BLOOD PRESSURE: 75 MMHG | WEIGHT: 178.8 LBS | TEMPERATURE: 98.1 F | OXYGEN SATURATION: 100 % | HEART RATE: 53 BPM

## 2019-04-19 DIAGNOSIS — E55.9 VITAMIN D DEFICIENCY: ICD-10-CM

## 2019-04-19 DIAGNOSIS — Z29.9 PREVENTIVE MEASURE: Primary | ICD-10-CM

## 2019-04-19 RX ORDER — MULTIVITAMIN,THERAPEUTIC
1 TABLET ORAL DAILY
Qty: 100 TABLET | Refills: 3 | Status: SHIPPED | OUTPATIENT
Start: 2019-04-19 | End: 2021-02-02

## 2019-04-21 NOTE — PROGRESS NOTES
SUBJECTIVE:  The patient is here because of wanting some lab work and having some body aches.  She has actually been seen at another clinic close to her home about these same issues and she did not tell me that she had recent blood work done just a few days ago for this exact same problem and actually had been referred to Rheumatology about this.  She had not heard the lab results.  She in the past had been low on vitamin D and that might be the problem again.  In the past I had prescribed her the high-dose Vitamin D 50,000 units and then when she saw another doctor at the other clinic, rather than prescribing the high dose 50,000 she was told to buy over-the-counter Vitamin D which was in gelatin which she as a muslin was not going to take.        OBJECTIVE:  On exam, she is in no acute distress.  Vital signs are stable.  When I reviewed the lab work that was just done at her other clinic that was quite extensive and everything was normal except she did have a low Vitamin D level at 20.      IMPRESSION:  Low Vitamin D level which could very well be explaining her symptoms.        PLAN:  I told her I would be happy to go ahead and prescribe the high dose Vitamin D again.  I wrote a prescription for her 50,000 units per week for 16 weeks.  I also suggest just a general multivitamin would be fine.  She will come back at her convenience for a Pap smear and she is due for a lipid panel which has not been drawn at the other clinic. We discussed that lipids would have nothing to do with her symptoms.        Visit length was 25 minutes, it took a while to review the chart, and finding the lab results and discussing them with her.

## 2019-04-29 ENCOUNTER — COMMUNICATION - HEALTHEAST (OUTPATIENT)
Dept: FAMILY MEDICINE | Facility: CLINIC | Age: 49
End: 2019-04-29

## 2019-05-01 NOTE — TELEPHONE ENCOUNTER
Referral reviewed by rheumatology provider, recommendation to have pt go to community provider.    Letter has been faxed to referring provider.  Pt was sent a my chart message.    Ena Graham RN  Rheumatology Clinic

## 2019-09-24 ENCOUNTER — OFFICE VISIT - HEALTHEAST (OUTPATIENT)
Dept: FAMILY MEDICINE | Facility: CLINIC | Age: 49
End: 2019-09-24

## 2019-09-24 DIAGNOSIS — R70.0 ELEVATED SED RATE: ICD-10-CM

## 2019-09-24 DIAGNOSIS — Z12.31 VISIT FOR SCREENING MAMMOGRAM: ICD-10-CM

## 2019-09-24 DIAGNOSIS — Z13.1 SCREENING FOR DIABETES MELLITUS: ICD-10-CM

## 2019-09-24 DIAGNOSIS — K21.9 GASTROESOPHAGEAL REFLUX DISEASE WITHOUT ESOPHAGITIS: ICD-10-CM

## 2019-09-24 DIAGNOSIS — L65.9 HAIR LOSS: ICD-10-CM

## 2019-09-24 DIAGNOSIS — R53.83 FATIGUE, UNSPECIFIED TYPE: ICD-10-CM

## 2019-09-24 DIAGNOSIS — D72.819 LEUKOPENIA, UNSPECIFIED TYPE: ICD-10-CM

## 2019-09-24 DIAGNOSIS — M35.3 POLYMYALGIA (H): ICD-10-CM

## 2019-09-24 LAB
ERYTHROCYTE [DISTWIDTH] IN BLOOD BY AUTOMATED COUNT: 12.9 % (ref 11–14.5)
ERYTHROCYTE [SEDIMENTATION RATE] IN BLOOD BY WESTERGREN METHOD: 28 MM/HR (ref 0–20)
FERRITIN SERPL-MCNC: 8 NG/ML (ref 10–130)
HBA1C MFR BLD: 5.5 % (ref 3.5–6)
HCT VFR BLD AUTO: 36.4 % (ref 35–47)
HGB BLD-MCNC: 12.1 G/DL (ref 12–16)
IRON SATN MFR SERPL: 15 % (ref 20–50)
IRON SERPL-MCNC: 58 UG/DL (ref 42–175)
MCH RBC QN AUTO: 28.4 PG (ref 27–34)
MCHC RBC AUTO-ENTMCNC: 33.4 G/DL (ref 32–36)
MCV RBC AUTO: 85 FL (ref 80–100)
PLATELET # BLD AUTO: 224 THOU/UL (ref 140–440)
PMV BLD AUTO: 8.3 FL (ref 7–10)
RBC # BLD AUTO: 4.27 MILL/UL (ref 3.8–5.4)
RHEUMATOID FACT SERPL-ACNC: <15 IU/ML (ref 0–30)
TIBC SERPL-MCNC: 376 UG/DL (ref 313–563)
TRANSFERRIN SERPL-MCNC: 301 MG/DL (ref 212–360)
TSH SERPL DL<=0.005 MIU/L-ACNC: 0.43 UIU/ML (ref 0.3–5)
WBC: 3.9 THOU/UL (ref 4–11)

## 2019-09-24 ASSESSMENT — MIFFLIN-ST. JEOR: SCORE: 1407.41

## 2019-09-25 LAB — 25(OH)D3 SERPL-MCNC: 49 NG/ML (ref 30–80)

## 2019-09-26 LAB — ANA SER QL: 1.1 U

## 2019-09-27 LAB
GLIADIN IGA SER-ACNC: 4.4 U/ML
GLIADIN IGG SER-ACNC: <0.4 U/ML
IGA SERPL-MCNC: 165 MG/DL (ref 65–400)
TTG IGA SER-ACNC: 0.2 U/ML
TTG IGG SER-ACNC: <0.6 U/ML

## 2019-09-29 ENCOUNTER — COMMUNICATION - HEALTHEAST (OUTPATIENT)
Dept: FAMILY MEDICINE | Facility: CLINIC | Age: 49
End: 2019-09-29

## 2019-09-29 ENCOUNTER — AMBULATORY - HEALTHEAST (OUTPATIENT)
Dept: FAMILY MEDICINE | Facility: CLINIC | Age: 49
End: 2019-09-29

## 2019-09-29 DIAGNOSIS — E61.1 IRON DEFICIENCY: ICD-10-CM

## 2019-10-03 ENCOUNTER — HEALTH MAINTENANCE LETTER (OUTPATIENT)
Age: 49
End: 2019-10-03

## 2019-10-08 ENCOUNTER — OFFICE VISIT - HEALTHEAST (OUTPATIENT)
Dept: FAMILY MEDICINE | Facility: CLINIC | Age: 49
End: 2019-10-08

## 2019-10-08 DIAGNOSIS — E61.1 IRON DEFICIENCY: ICD-10-CM

## 2019-10-08 DIAGNOSIS — M54.2 NECK PAIN: ICD-10-CM

## 2019-10-08 DIAGNOSIS — D72.819 LEUKOPENIA, UNSPECIFIED TYPE: ICD-10-CM

## 2019-10-08 DIAGNOSIS — R70.0 ELEVATED SED RATE: ICD-10-CM

## 2019-10-08 DIAGNOSIS — M35.3 POLYMYALGIA (H): ICD-10-CM

## 2019-10-08 DIAGNOSIS — R53.83 FATIGUE, UNSPECIFIED TYPE: ICD-10-CM

## 2019-10-08 DIAGNOSIS — G43.109 MIGRAINE WITH AURA AND WITHOUT STATUS MIGRAINOSUS, NOT INTRACTABLE: ICD-10-CM

## 2019-10-08 ASSESSMENT — MIFFLIN-ST. JEOR: SCORE: 1413.31

## 2019-10-21 ENCOUNTER — COMMUNICATION - HEALTHEAST (OUTPATIENT)
Dept: FAMILY MEDICINE | Facility: CLINIC | Age: 49
End: 2019-10-21

## 2019-10-21 DIAGNOSIS — E55.9 VITAMIN D DEFICIENCY: ICD-10-CM

## 2019-10-29 ENCOUNTER — AMBULATORY - HEALTHEAST (OUTPATIENT)
Dept: MULTI SPECIALTY CLINIC | Facility: CLINIC | Age: 49
End: 2019-10-29

## 2019-10-29 LAB
HPV_EXT - HISTORICAL: NORMAL
PAP SMEAR - HIM PATIENT REPORTED: NORMAL

## 2019-11-25 ENCOUNTER — HOSPITAL ENCOUNTER (OUTPATIENT)
Dept: MAMMOGRAPHY | Facility: CLINIC | Age: 49
Discharge: HOME | End: 2019-11-25
Attending: NURSE PRACTITIONER

## 2019-11-25 DIAGNOSIS — Z12.31 VISIT FOR SCREENING MAMMOGRAM: ICD-10-CM

## 2019-12-09 ENCOUNTER — OFFICE VISIT - HEALTHEAST (OUTPATIENT)
Dept: RHEUMATOLOGY | Facility: CLINIC | Age: 49
End: 2019-12-09

## 2019-12-09 DIAGNOSIS — R51.9 CHRONIC RIGHT-SIDED HEADACHES: ICD-10-CM

## 2019-12-09 DIAGNOSIS — M25.511 CHRONIC PAIN OF BOTH SHOULDERS: ICD-10-CM

## 2019-12-09 DIAGNOSIS — D72.819 LEUKOPENIA, UNSPECIFIED TYPE: ICD-10-CM

## 2019-12-09 DIAGNOSIS — G89.29 CHRONIC NECK PAIN: ICD-10-CM

## 2019-12-09 DIAGNOSIS — G89.29 CHRONIC RIGHT-SIDED HEADACHES: ICD-10-CM

## 2019-12-09 DIAGNOSIS — M25.512 CHRONIC PAIN OF BOTH SHOULDERS: ICD-10-CM

## 2019-12-09 DIAGNOSIS — G89.29 CHRONIC PAIN OF BOTH SHOULDERS: ICD-10-CM

## 2019-12-09 DIAGNOSIS — L65.9 ALOPECIA: ICD-10-CM

## 2019-12-09 DIAGNOSIS — R70.0 ELEVATED SED RATE: ICD-10-CM

## 2019-12-09 DIAGNOSIS — M54.2 CHRONIC NECK PAIN: ICD-10-CM

## 2019-12-09 LAB
C REACTIVE PROTEIN LHE: 0.3 MG/DL (ref 0–0.8)
ERYTHROCYTE [SEDIMENTATION RATE] IN BLOOD BY WESTERGREN METHOD: 13 MM/HR (ref 0–20)

## 2019-12-09 ASSESSMENT — MIFFLIN-ST. JEOR: SCORE: 1438.71

## 2019-12-10 ENCOUNTER — RECORDS - HEALTHEAST (OUTPATIENT)
Dept: GENERAL RADIOLOGY | Facility: CLINIC | Age: 49
End: 2019-12-10

## 2019-12-10 DIAGNOSIS — M54.2 CERVICALGIA: ICD-10-CM

## 2019-12-10 DIAGNOSIS — G89.29 OTHER CHRONIC PAIN: ICD-10-CM

## 2019-12-10 DIAGNOSIS — M25.511 PAIN IN RIGHT SHOULDER: ICD-10-CM

## 2019-12-10 DIAGNOSIS — M25.512 PAIN IN LEFT SHOULDER: ICD-10-CM

## 2019-12-10 LAB — SM/RNP AUTOANTIBODIES - HISTORICAL: 1 EU

## 2019-12-11 LAB
ANCA IGG TITR SER IF: NORMAL {TITER}
CCP AB SER IA-ACNC: 0.5 U/ML

## 2019-12-13 LAB — DNA (DS) ANTIBODY - HISTORICAL: 5 IU

## 2020-01-09 ENCOUNTER — COMMUNICATION - HEALTHEAST (OUTPATIENT)
Dept: FAMILY MEDICINE | Facility: CLINIC | Age: 50
End: 2020-01-09

## 2020-01-09 DIAGNOSIS — E61.1 IRON DEFICIENCY: ICD-10-CM

## 2020-03-22 ENCOUNTER — HEALTH MAINTENANCE LETTER (OUTPATIENT)
Age: 50
End: 2020-03-22

## 2020-04-23 ENCOUNTER — COMMUNICATION - HEALTHEAST (OUTPATIENT)
Dept: FAMILY MEDICINE | Facility: CLINIC | Age: 50
End: 2020-04-23

## 2020-04-23 DIAGNOSIS — K21.9 GASTROESOPHAGEAL REFLUX DISEASE WITHOUT ESOPHAGITIS: ICD-10-CM

## 2020-08-05 ENCOUNTER — COMMUNICATION - HEALTHEAST (OUTPATIENT)
Dept: FAMILY MEDICINE | Facility: CLINIC | Age: 50
End: 2020-08-05

## 2020-08-05 DIAGNOSIS — M54.2 NECK PAIN: ICD-10-CM

## 2020-08-05 DIAGNOSIS — G43.109 MIGRAINE WITH AURA AND WITHOUT STATUS MIGRAINOSUS, NOT INTRACTABLE: ICD-10-CM

## 2021-01-15 ENCOUNTER — HEALTH MAINTENANCE LETTER (OUTPATIENT)
Age: 51
End: 2021-01-15

## 2021-02-02 DIAGNOSIS — Z29.9 PREVENTIVE MEASURE: ICD-10-CM

## 2021-02-02 RX ORDER — MULTIVITAMIN,THERAPEUTIC
1 TABLET ORAL DAILY
Qty: 100 TABLET | Refills: 3 | Status: SHIPPED | OUTPATIENT
Start: 2021-02-02 | End: 2024-04-30

## 2021-02-02 NOTE — TELEPHONE ENCOUNTER

## 2021-03-21 ENCOUNTER — HEALTH MAINTENANCE LETTER (OUTPATIENT)
Age: 51
End: 2021-03-21

## 2021-05-04 ENCOUNTER — COMMUNICATION - HEALTHEAST (OUTPATIENT)
Dept: FAMILY MEDICINE | Facility: CLINIC | Age: 51
End: 2021-05-04

## 2021-05-04 DIAGNOSIS — G43.109 MIGRAINE WITH AURA AND WITHOUT STATUS MIGRAINOSUS, NOT INTRACTABLE: ICD-10-CM

## 2021-05-04 DIAGNOSIS — M54.2 NECK PAIN: ICD-10-CM

## 2021-05-27 NOTE — PROGRESS NOTES
"HPI:   Ana Cristina Mc is a 49 y.o. female who presents a little over a year later to rediscuss cholecystectomy.  After she was last seen, she worked on modifying her diet and her gallbladder had not been causing her many issues.  Over the last 2 weeks, she has had 2 attacks.  She notes right upper quadrant pain that radiates around the flank.  She notes the pain most commonly after a fast and then she eats.  The pain begins postprandially.  She denies any nausea, vomiting, fevers, chills, juandice or any other symptoms at present.       Allergies:  Patient has no known allergies.    Past medical history:  Denies    Past surgical history:   x1    Current medications:  Multivitamin    Family history:  Family History   Problem Relation Age of Onset     No Medical Problems Mother      No Medical Problems Father      No Medical Problems Sister      No Medical Problems Brother      No Medical Problems Brother      No Medical Problems Brother      No Medical Problems Brother      No Medical Problems Brother        Social history:   reports that  has never smoked. she has never used smokeless tobacco. She reports that she does not drink alcohol or use drugs.    Review of Systems:  General: No complaints or constitutional symptoms  Skin: No complaints or symptoms   Hematologic/Lymphatic: No symptoms or complaints  Psychiatric: No symptoms or complaints  Endocrine: No excessive fatigue, no hypermetabolic symptoms reported  Respiratory: No cough, shortness of breath, or wheezing  Cardiovascular: No chest pain or dyspnea on exertion  Gastrointestinal: As per HPI  Musculoskeletal: No recent injuries reported  Neurological: No focal neurologic defects reported.      EXAM:  /61 (Patient Site: Right Arm, Patient Position: Sitting, Cuff Size: Adult Regular)   Pulse (!) 59   Ht 5' 4\" (1.626 m)   SpO2 98%   Breastfeeding? No   BMI 29.87 kg/m    Body mass index is 29.87 kg/m .  General: Alert, cooperative, appears " stated age   Skin: Skin color, texture, turgor normal, no rashes or lesions   Lymphatic: No obvious adenopathy, no swelling   Eyes: No scleral icterus, pupils equal  HENT: No traumatic injury to the head or face, no gross abnormalities  Lungs: Normal respiratory effort, breath sounds equal bilaterally  Heart: Regular rate and rhythm  Abdomen: Soft, nondistended, nontender to palpation  Musculoskeletal: No obvious swelling or deformities  Neurologic: Grossly intact    Assessment/Plan:     Ana Cristina Mc is a 49 y.o. female with signs and symptoms consistent with biliary colic.  I have explained the pathophysiology of gallbladder disease in detail as well as the surgical versus non-operative management strategies.  She has trialed conservative management and is now interested in surgical intervention.  Her health insurance is currently inactive.  Her  is working on getting insurance again.  Once her insurance is active, she will call our surgery scheduler and we will set up a date for laparoscopic cholecystectomy.  The risks and benefits of surgery were discussed.  Also discussed postoperative recovery and restrictions.    Debbie Stauffer, DO   [facility]  [phone]

## 2021-05-27 NOTE — TELEPHONE ENCOUNTER
Reason contacted:  Results   Information relayed:  Below message   Additional questions:  No  Further follow-up needed:  No  Okay to leave a detailed message:  No

## 2021-05-27 NOTE — TELEPHONE ENCOUNTER
Please see pharmacist's recommendations.  She would need to switch to tablet form which is 2000 units of Vitamin D3 daily that she can buy over-the-counter.  She can also speak to the pharmacist at her pharmacy to see if there is a larger dose tablet that is an option over-the-counter. Thanks.

## 2021-05-27 NOTE — PROGRESS NOTES
Assessment and Plan:     1. Menorrhagia with regular cycle  We will rule out thyroid disease and hyperprolactinemia.  Will obtain pelvic ultrasound to rule out endometrial hyperplasia and uterine fibroids.  Further plans pending the results.  - US Pelvis With Transvaginal Non OB; Future  - Prolactin    2. Fatigue, unspecified type  We will rule out anemia, diabetes, vitamin deficiencies, thyroid disease, autoimmune disease.  Will refer to rheumatology due to the fact that patient has complained for multiple years and symptoms tend to present in flares.  - HM2(CBC w/o Differential)  - Comprehensive Metabolic Panel  - Vitamin D, Total (25-Hydroxy)  - Vitamin B12  - Thyroid Cascade  - Sedimentation Rate  - Ambulatory referral to Rheumatology    3. Polymyalgia (H)  We will rule out anemia, vitamin deficiencies, inflammation.  Will refer to rheumatology.  - HM2(CBC w/o Differential)  - Comprehensive Metabolic Panel  - Vitamin D, Total (25-Hydroxy)  - Vitamin B12  - Thyroid Cascade  - Sedimentation Rate  - Ambulatory referral to Rheumatology    4. Gastroesophageal reflux disease without esophagitis  Provided prescription for omeprazole.  She has taken this in the past and this has worked well for her.  She is content with the plan.  - omeprazole (PRILOSEC) 40 MG capsule; Take 1 capsule (40 mg total) by mouth daily.  Dispense: 90 capsule; Refill: 2      Subjective:     Ana Cristina is a 49 y.o. female presenting to the clinic for concerns for ongoing fatigue and polymyalgias.  She has been seen for this before in the past.  She has a history of anemia and vitamin D deficiency.  Patient states for the past week, she has developed fatigue and generalized myalgias.  She has had occasional dizziness.  She feels as though her body is weak.  She is sleeping well at night.  She does not snore.  She was taking vitamin D 2000 units daily but finished this last month.  She has found that her symptoms do tend to correlate with her menstrual  period.  She states her period started 2 days ago.  Her periods are heavy for the first 2 days.  She occasionally has large clots.  Her periods last for 7 days.  Her periods are regular, occurring once per month.  She requests renewal of omeprazole today.  She has had intermittent heartburn.  Omeprazole works well for her.    Review of Systems: A complete 14 point review of systems was obtained and is negative or as stated in the history of present illness.    Social History     Socioeconomic History     Marital status:      Spouse name: Not on file     Number of children: Not on file     Years of education: Not on file     Highest education level: Not on file   Occupational History     Not on file   Social Needs     Financial resource strain: Not on file     Food insecurity:     Worry: Not on file     Inability: Not on file     Transportation needs:     Medical: Not on file     Non-medical: Not on file   Tobacco Use     Smoking status: Never Smoker     Smokeless tobacco: Never Used   Substance and Sexual Activity     Alcohol use: No     Frequency: Never     Binge frequency: Never     Drug use: No     Sexual activity: Not on file   Lifestyle     Physical activity:     Days per week: Not on file     Minutes per session: Not on file     Stress: Not on file   Relationships     Social connections:     Talks on phone: Not on file     Gets together: Not on file     Attends Scientologist service: Not on file     Active member of club or organization: Not on file     Attends meetings of clubs or organizations: Not on file     Relationship status: Not on file     Intimate partner violence:     Fear of current or ex partner: Not on file     Emotionally abused: Not on file     Physically abused: Not on file     Forced sexual activity: Not on file   Other Topics Concern     Not on file   Social History Narrative    Patient is right hand dominant.                Active Ambulatory Problems     Diagnosis Date Noted      "Hyperlipidemia      Lower Back Pain      Cervical disc herniation 04/29/2016     Calculus of gallbladder 05/20/2014     Obesity 09/04/2012     Resolved Ambulatory Problems     Diagnosis Date Noted     Arthropathy Of The Shoulder Region      Limb Pain      Fatigue      Chest Pain      Vitamin D deficiency      Vaginal Discharge      Pain During Urination (Dysuria)      Hematuria      Abdominal pain      Esophageal reflux      Past Medical History:   Diagnosis Date     Arthropathy Of The Shoulder Region      Hyperlipidemia        Family History   Problem Relation Age of Onset     No Medical Problems Mother      No Medical Problems Father      No Medical Problems Sister      No Medical Problems Brother      No Medical Problems Brother      No Medical Problems Brother      No Medical Problems Brother      No Medical Problems Brother        Objective:     /60   Pulse 72   Ht 5' 4\" (1.626 m)   Wt 176 lb 9.6 oz (80.1 kg)   LMP 04/13/2019   SpO2 99%   BMI 30.31 kg/m      Patient is alert, in no obvious distress.   Skin: Warm, dry.  No lesions or rashes.  Skin turgor rapid return.   HEENT:  Head normocephalic, atraumatic.  Eyes normal.  Ears normal.  Nose patent, mucosa pink.  Oropharynx mucosa pink.  No lesions or tonsillar enlargement.   Neck: Supple, no lymphadenopathy. No thyromegaly.  Lungs:  Clear to auscultation. Respirations even and unlabored.  No wheezing or rales noted.   Heart:  Regular rate and rhythm.  No murmurs.   Abdomen: Soft, nontender.  No organomegaly. Bowel sounds normoactive. No guarding or masses noted.                "

## 2021-05-27 NOTE — TELEPHONE ENCOUNTER
Medication Question or Clarification  Who is calling: Patient  What medication are you calling about? (include dose and sig)   ergocalciferol (ERGOCALCIFEROL) 50,000 unit capsule 24 capsule 0 4/15/2019 7/5/2019    Sig - Route: Take 1 capsule (50,000 Units total) by mouth 2 (two) times a week for 24 doses. - Oral    Sent to pharmacy as: ergocalciferol (ERGOCALCIFEROL) 50,000 unit capsule        Who prescribed the medication?: Daphney Conde CNP  What is your question/concern?: I would like this medication in a tablet form as the capsule has pork in this. Please resend Rx   Pharmacy: [facility] to leave a detailed message?: Yes  Site CMT - Please call the pharmacy to obtain any additional needed information.

## 2021-05-27 NOTE — TELEPHONE ENCOUNTER
Reason contacted:  Medication problem  Information relayed:  Below message, patient notified to return in 3 months for a lab only appointment.   Additional questions:  No  Further follow-up needed:  No  Okay to leave a detailed message:  No

## 2021-05-27 NOTE — TELEPHONE ENCOUNTER
----- Message from Daphney Conde CNP sent at 4/15/2019  8:51 PM CDT -----  Please notify the patient that her vitamin d is low.  I sent a prescription to the pharmacy for vitamin D 17533 units twice weekly for 12 weeks.  I recommend a lab recheck then.  Once she completes the high dose, she needs to take 2000 units of Vitamin d3 daily to maintain a healthy vitamin D level.  She can buy this over-the-counter.  Secondly, her white blood count is mildly low and her sed rate is mildly elevated which can be due to a viral infection.  I recommend rechecking this in one month to assess for improvement.  I also encourage her to see the rheumatologist as we discussed to rule out an autoimmune disease.  Thanks.

## 2021-05-30 VITALS — BODY MASS INDEX: 29.41 KG/M2 | WEIGHT: 172.3 LBS | HEIGHT: 64 IN

## 2021-05-30 VITALS — WEIGHT: 171 LBS | BODY MASS INDEX: 29.35 KG/M2

## 2021-05-31 VITALS — BODY MASS INDEX: 28.92 KG/M2 | WEIGHT: 168.5 LBS

## 2021-05-31 VITALS — BODY MASS INDEX: 28.42 KG/M2 | WEIGHT: 166.5 LBS | HEIGHT: 64 IN

## 2021-06-01 VITALS — WEIGHT: 171.9 LBS | BODY MASS INDEX: 30.94 KG/M2

## 2021-06-01 VITALS — WEIGHT: 161.5 LBS | BODY MASS INDEX: 28.62 KG/M2 | HEIGHT: 63 IN

## 2021-06-01 VITALS — BODY MASS INDEX: 28.3 KG/M2 | WEIGHT: 159.7 LBS | HEIGHT: 63 IN

## 2021-06-01 VITALS — WEIGHT: 172 LBS | BODY MASS INDEX: 30.96 KG/M2

## 2021-06-01 VITALS — BODY MASS INDEX: 30.78 KG/M2 | WEIGHT: 171 LBS

## 2021-06-01 NOTE — PROGRESS NOTES
Assessment and Plan:     1. Polymyalgia (H)  HM2(CBC w/o Differential)    Sedimentation Rate    Vitamin D, Total (25-Hydroxy)    Ferritin    Iron and Transferrin Iron Binding Capacity    Antinuclear Antibody (LOVE) Cascade    Rheumatoid Factor Quant    Ambulatory referral to Rheumatology    Glycosylated Hemoglobin A1c   2. Hair loss  Antinuclear Antibody (LOVE) [place]    Ambulatory referral to Rheumatology    Thyroid Cascade   3. Leukopenia, unspecified type  HM2(CBC w/o Differential)    Ambulatory referral to Rheumatology   4. Elevated sed rate  Ambulatory referral to Rheumatology   5. Gastroesophageal reflux disease without esophagitis  omeprazole (PRILOSEC) 40 MG capsule   6. Fatigue, unspecified type  HM2(CBC w/o Differential)    Sedimentation Rate    Vitamin D, Total (25-Hydroxy)    Ferritin    Iron and Transferrin Iron Binding Capacity    Antinuclear Antibody (LOVE) Cascade    Rheumatoid Factor Quant    Ambulatory referral to Rheumatology   7. Screening for diabetes mellitus  Celiac(Gluten)Antibody Panel ($$$)   8. Visit for screening mammogram  Mammo Screening Bilateral     Patient has symptoms concerning for an autoimmune disease.  Will refer to rheumatology.  We will recheck autoimmune labs today including celiac which has not been checked in the past.  Patient is also had some hair loss.  We will recheck thyroid cascade as her TSH has been on the lower end of normal.  She continues omeprazole for esophageal reflux which is controlled.  She has a history of vitamin D deficiency.  She is not currently taking supplementation.  Will notify patient of results.  She is content with the plan.    Subjective:     Ana Cristina is a 49 y.o. female presenting to the clinic for concerns for fatigue and polymyalgias.  This is been ongoing for 2 to 3 weeks.  It is not uncommon for her to have thesePatient will wake up with stiffness within her muscles especially her neck and shoulders.  She feels weak and has difficulty  completing tasks during the day.  Symptoms are worse in the morning and improve as the day progresses.  Patient has noticed some hair loss.  She states it is generalized.  Her symptoms are common for her.  She presents to the clinic at least once yearly with these symptoms.  She has known vitamin D deficiency.  She completed 50,000 unit once weekly dosing last month and is not currently taking any vitamin D.  She has had a mildly elevated sed rate of 25 in the past and has had leukopenia.  In 2017, autoimmune labs were unremarkable.  She denies any concerns regarding Lyme's disease.  She denies any recent tick bites.    Review of Systems: A complete 14 point review of systems was obtained and is negative or as stated in the history of present illness.    Social History     Socioeconomic History     Marital status:      Spouse name: Not on file     Number of children: Not on file     Years of education: Not on file     Highest education level: Not on file   Occupational History     Not on file   Social Needs     Financial resource strain: Not on file     Food insecurity:     Worry: Not on file     Inability: Not on file     Transportation needs:     Medical: Not on file     Non-medical: Not on file   Tobacco Use     Smoking status: Never Smoker     Smokeless tobacco: Never Used   Substance and Sexual Activity     Alcohol use: No     Frequency: Never     Binge frequency: Never     Drug use: No     Sexual activity: Not on file   Lifestyle     Physical activity:     Days per week: Not on file     Minutes per session: Not on file     Stress: Not on file   Relationships     Social connections:     Talks on phone: Not on file     Gets together: Not on file     Attends Church service: Not on file     Active member of club or organization: Not on file     Attends meetings of clubs or organizations: Not on file     Relationship status: Not on file     Intimate partner violence:     Fear of current or ex partner: Not  "on file     Emotionally abused: Not on file     Physically abused: Not on file     Forced sexual activity: Not on file   Other Topics Concern     Not on file   Social History Narrative    Patient is right hand dominant.                Active Ambulatory Problems     Diagnosis Date Noted     Hyperlipidemia      Lower Back Pain      Cervical disc herniation 04/29/2016     Calculus of gallbladder 05/20/2014     Obesity 09/04/2012     Resolved Ambulatory Problems     Diagnosis Date Noted     Arthropathy Of The Shoulder Region      Limb Pain      Fatigue      Chest Pain      Vitamin D deficiency      Vaginal Discharge      Pain During Urination (Dysuria)      Hematuria      Abdominal pain      Esophageal reflux      Past Medical History:   Diagnosis Date     Arthropathy Of The Shoulder Region        Family History   Problem Relation Age of Onset     No Medical Problems Mother      No Medical Problems Father      No Medical Problems Sister      No Medical Problems Brother      No Medical Problems Brother      No Medical Problems Brother      No Medical Problems Brother      No Medical Problems Brother        Objective:     BP 90/70   Pulse 63   Ht 5' 4\" (1.626 m)   Wt 176 lb 14.4 oz (80.2 kg)   LMP 09/03/2019   SpO2 99%   BMI 30.36 kg/m      Patient is alert, in no obvious distress.   Skin: Warm, dry.  No lesions or rashes.  Skin turgor rapid return.   HEENT:  Head normocephalic, atraumatic.  Eyes normal.  Ears normal.  Nose patent, mucosa pink.  Oropharynx mucosa pink.  No lesions or tonsillar enlargement.   Neck: Supple, no lymphadenopathy. No thyromegaly.  Lungs:  Clear to auscultation. Respirations even and unlabored.  No wheezing or rales noted.   Heart:  Regular rate and rhythm.  No murmurs, S3, S4, gallops, or rubs.    Abdomen: Soft, nontender.  No organomegaly. Bowel sounds normoactive. No guarding or masses noted.   Musculoskeletal:  Full ROM of extremities.  DTRs symmetrical, sensations intact.  No obvious " deformity.  Muscle strength equal +5/5.   Neurological:  Cranial nerves 2-12 intact.

## 2021-06-02 VITALS — BODY MASS INDEX: 29.87 KG/M2 | WEIGHT: 174 LBS

## 2021-06-02 VITALS — WEIGHT: 176.6 LBS | HEIGHT: 64 IN | BODY MASS INDEX: 30.15 KG/M2

## 2021-06-02 VITALS — BODY MASS INDEX: 29.77 KG/M2 | WEIGHT: 173.44 LBS

## 2021-06-02 VITALS — HEIGHT: 64 IN | WEIGHT: 177.25 LBS | BODY MASS INDEX: 30.26 KG/M2

## 2021-06-02 VITALS — HEIGHT: 64 IN | BODY MASS INDEX: 29.87 KG/M2

## 2021-06-02 VITALS — WEIGHT: 174 LBS | BODY MASS INDEX: 29.87 KG/M2

## 2021-06-02 NOTE — TELEPHONE ENCOUNTER
Left message to call back for: medication refill   Information to relay to patient:  Below message

## 2021-06-02 NOTE — TELEPHONE ENCOUNTER
RN cannot approve Refill Request    RN can NOT refill this medication med is not covered by policy/route to provider. Last office visit: 10/8/2019 Daphney Conde CNP Last Physical: Visit date not found Last MTM visit: Visit date not found Last visit same specialty: 10/8/2019 Daphney Conde CNP.  Next visit within 3 mo: Visit date not found  Next physical within 3 mo: Visit date not found      Meredith Briceno, [facility] Triage/Med Refill 10/21/2019    Requested Prescriptions   Pending Prescriptions Disp Refills     ergocalciferol (ERGOCALCIFEROL) 50,000 unit capsule [Pharmacy Med Name: VITAMIN D2 50,000IU (ERGO) CAP RX] 24 capsule 0     Sig: TAKE 1 CAPSULE BY MOUTH 2 TIMES A WEEK FOR 24 DOSES       There is no refill protocol information for this order

## 2021-06-02 NOTE — PROGRESS NOTES
Assessment and Plan:     1. Migraine with aura and without status migrainosus, not intractable  cyclobenzaprine (FLEXERIL) 5 MG tablet   2. Neck pain  cyclobenzaprine (FLEXERIL) 5 MG tablet   3. Leukopenia, unspecified type     4. Elevated sed rate     5. Polymyalgia (H)     6. Fatigue, unspecified type     7. Iron deficiency       Patient requests cyclobenzaprine to use as needed for migraine headaches and neck pain. Will recheck white blood count in 1 month.  Patient has a chronically elevated sed rate.  She recently had a weakly positive LOVE.  Will refer to rheumatology for further evaluation of underlying autoimmune disease which may also be causing the mild leukopenia.  Patient has a low ferritin, so we will start an iron supplement.  We will recheck ferritin in 2 to 3 months.  She is content with the plan.    Subjective:     Ana Cristina is a 49 y.o. female presenting to the clinic for follow-up on lab results.  Patient was seen on 9/24/2019 with concerns of polymyalgia, fatigue, and hair loss.  She was noted to have mild leukopenia at 3.9.  Sed rate was mildly elevated at 28.  LOVE was weakly positive at 1.1.  Patient was noted to have low ferritin.  I sent a prescription for an iron supplement to the pharmacy.  I recommended that patient see rheumatology.  Patient did not receive the letter via my chart and wanted to discuss these abnormal lab results today.  Patient also requests refill of cyclobenzaprine.  She takes this as needed for intermittent neck pain and headaches.  Patient states she takes 1 at least once a week and this improves her symptoms.  Patient has seen neurology who suspects migraine headaches.    Review of Systems: A complete 14 point review of systems was obtained and is negative or as stated in the history of present illness.    Social History     Socioeconomic History     Marital status:      Spouse name: Not on file     Number of children: Not on file     Years of education: Not on  file     Highest education level: Not on file   Occupational History     Not on file   Social Needs     Financial resource strain: Not on file     Food insecurity:     Worry: Not on file     Inability: Not on file     Transportation needs:     Medical: Not on file     Non-medical: Not on file   Tobacco Use     Smoking status: Never Smoker     Smokeless tobacco: Never Used   Substance and Sexual Activity     Alcohol use: No     Frequency: Never     Binge frequency: Never     Drug use: No     Sexual activity: Not on file   Lifestyle     Physical activity:     Days per week: Not on file     Minutes per session: Not on file     Stress: Not on file   Relationships     Social connections:     Talks on phone: Not on file     Gets together: Not on file     Attends Moravian service: Not on file     Active member of club or organization: Not on file     Attends meetings of clubs or organizations: Not on file     Relationship status: Not on file     Intimate partner violence:     Fear of current or ex partner: Not on file     Emotionally abused: Not on file     Physically abused: Not on file     Forced sexual activity: Not on file   Other Topics Concern     Not on file   Social History Narrative    Patient is right hand dominant.                Active Ambulatory Problems     Diagnosis Date Noted     Hyperlipidemia      Lower Back Pain      Cervical disc herniation 04/29/2016     Calculus of gallbladder 05/20/2014     Obesity 09/04/2012     Resolved Ambulatory Problems     Diagnosis Date Noted     Arthropathy Of The Shoulder Region      Limb Pain      Fatigue      Chest Pain      Vitamin D deficiency      Vaginal Discharge      Pain During Urination (Dysuria)      Hematuria      Abdominal pain      Esophageal reflux      Past Medical History:   Diagnosis Date     Arthropathy Of The Shoulder Region        Family History   Problem Relation Age of Onset     No Medical Problems Mother      No Medical Problems Father      No  "Medical Problems Sister      No Medical Problems Brother      No Medical Problems Brother      No Medical Problems Brother      No Medical Problems Brother      No Medical Problems Brother        Objective:     /66   Pulse 74   Ht 5' 4\" (1.626 m)   Wt 178 lb 3.2 oz (80.8 kg)   SpO2 99%   BMI 30.59 kg/m      Patient is alert, in no obvious distress.   Skin: Warm, dry.    Lungs:  Clear to auscultation. Respirations even and unlabored.  No wheezing or rales noted.   Heart:  Regular rate and rhythm.  No murmurs.                "

## 2021-06-03 VITALS
WEIGHT: 178.2 LBS | SYSTOLIC BLOOD PRESSURE: 104 MMHG | HEART RATE: 74 BPM | OXYGEN SATURATION: 99 % | BODY MASS INDEX: 30.42 KG/M2 | HEIGHT: 64 IN | DIASTOLIC BLOOD PRESSURE: 66 MMHG

## 2021-06-03 VITALS
WEIGHT: 183.8 LBS | HEIGHT: 64 IN | DIASTOLIC BLOOD PRESSURE: 64 MMHG | BODY MASS INDEX: 31.38 KG/M2 | SYSTOLIC BLOOD PRESSURE: 116 MMHG | HEART RATE: 68 BPM

## 2021-06-03 VITALS
HEIGHT: 64 IN | OXYGEN SATURATION: 99 % | WEIGHT: 176.9 LBS | SYSTOLIC BLOOD PRESSURE: 90 MMHG | BODY MASS INDEX: 30.2 KG/M2 | HEART RATE: 63 BPM | DIASTOLIC BLOOD PRESSURE: 70 MMHG

## 2021-06-04 NOTE — PATIENT INSTRUCTIONS - HE
Summary of Your Rheumatology Visit    Next Appointment:  3 Months, sooner if necessary.      If you develop any persistent double vision, loss of visual fields or painful vision recommend going to the emergency room for immediate higher dosing of steroid treatment.    Medications:    Please follow directives on pill bottle on how to take medication(s) provided.    Please contact us in about 1-2 weeks informing us percentagewise how much better you are feeling since starting prednisone.  If feeling greater than 80% better with regards to neck/shoulder girdle discomfort/stiffness then recommend continuing long-term tapering regimen as discussed.  If not receiving significant benefit then we will provide instructions on how to taper to off.      Referrals:      Tests:     Please have labs and xrays performed today and a few days prior to next visit.       Injections:        Other:

## 2021-06-04 NOTE — PROGRESS NOTES
Ana Cristina Mc who presents today with a chief complaint of  Consult (polymyalgia and hair loss)      Joint Pains: No   Location:  Upper back/neck/shoulders  Onset: Chronic 9+ months   Intensity: 4-10 /10  AM Stiffness: yes, 2-3 hours   Alleviating/Aggravating Factors: Medications not helpful  Tolerating Meds: Yes tolerate med.   Other:      ROS:  Patient denies having: persistent dry eyes, dry mouth, recurrent oral ulcers, patchy alopecia, active rashes, photosensitivity, history of psoriasis, active chest pain, active shortness of breath, active cough, active dysuria, history of kidney stones, active abdominal pain, active diarrhea, history of hematochezia, active dysphagia, history of peptic ulcer disease, history of HIV, tuberculosis, hepatitis B or C, Lyme disease, seizure history, raynaud's, active documented fevers, recent infections, difficulty sleeping or chronic unrefreshing sleep, involuntary weight loss, loss of appetite, excessive fatigue, depression, anxiety,  numbness and tingling [if yes, where], recurrent sinus infections, history of inflammatory eye diseases (such as uveitis, scleritis, iritis, etc).      Positive right parietal headaches.  Denies:Jaw and scalp pain persistent double vision, loss of field of vision or painful vision.    Information gathered by medical assistant incorporated into this note, was reviewed and discussed with the patient.    Problem List:  Patient Active Problem List   Diagnosis     Hyperlipidemia     Lower Back Pain     Cervical disc herniation     Calculus of gallbladder     Obesity     Iron deficiency     Polymyalgia (H)     Elevated sed rate     Leukopenia, unspecified type     Migraine with aura and without status migrainosus, not intractable        PMH:   Past Medical History:   Diagnosis Date     Arthropathy Of The Shoulder Region     Created by Conversion      Hyperlipidemia     Created by Conversion        Surgical History:  Past Surgical History:   Procedure  "Laterality Date      SECTION      x1       Family History:  Family History   Problem Relation Age of Onset     No Medical Problems Mother      No Medical Problems Father      No Medical Problems Sister      No Medical Problems Brother      No Medical Problems Brother      No Medical Problems Brother      No Medical Problems Brother      No Medical Problems Brother        Social History:   reports that she has never smoked. She has never used smokeless tobacco. She reports that she does not drink alcohol or use drugs.    Allergies:  No Known Allergies     Current Medications:  Current Outpatient Medications   Medication Sig Dispense Refill     cholecalciferol, vitamin D3, (VITAMIN D3) 2,000 unit Tab Take 1 tablet (2,000 Units total) by mouth daily. 90 tablet 3     cyclobenzaprine (FLEXERIL) 5 MG tablet Take 1-2 tablets (5-10 mg total) by mouth 3 (three) times a day as needed for muscle spasms. 30 tablet 1     ferrous sulfate 325 (65 FE) MG tablet Take 1 tablet (325 mg total) by mouth daily with breakfast. 30 tablet 2     ibuprofen (ADVIL,MOTRIN) 800 MG tablet Take 1 tablet (800 mg total) by mouth every 6 (six) hours as needed for pain. 30 tablet 1     multivitamin (ONE A DAY) per tablet Take 1 tablet by mouth daily.        omeprazole (PRILOSEC) 40 MG capsule Take 1 capsule (40 mg total) by mouth daily. 90 capsule 2     No current facility-administered medications for this visit.            Physical Exam:  /64   Pulse 68   Ht 5' 4\" (1.626 m)   Wt 183 lb 12.8 oz (83.4 kg)   BMI 31.55 kg/m     Exam limited as patient for Jewish reasons started to remain fully clothed including head covering for exam.  General: A & O x 3 in NAD  HEENT: EOMI, Non injected/non icteric sclera, no oral lesions noted  Neck: Supple, no cervical LAD or thyromegaly noted  Derm: No malar rash, psoriatic lesions or nail pitting appreciated  CV: s1s2 with RRR, no rubs appreciated, no temporal artery regional discomfort on " palpating, no palpable cords noted.  Despite thin layer head covering, able to appreciate bilateral temporal artery pulses.  Lungs: CTA B/L, no wheezing , rales or rhonci appreciated  GI: Soft, NT/ND, no rebound, no guarding noted, no hepatomegally appreciated  MS: Mild discomfort involving posterior C-spine on active range of motion testing. Passive range of motion testing of shoulders did not reproduce any pains.  Insignificant amount of myofascial tender points on exam.   Otherwise patient demonstrated good passive/active ROM over other joints with no warmth, erythema, tenderness or synovitis noted over these joints.  Back: negative straight leg raising  Neuro: 5/5 strength in upper and lower extremities b/l, 1- 2+ bicep and patella reflexes b/l      Summary/Assessment:    Pleasant 49-year-old female presents with about a 9-month history of shoulder girdle/neck pain with some morning stiffness.  Noted to have mildly elevated ESR level.    Although patient is only 49 years old her presentation appears to be consistent with likely having PMR.    On review of systems, patient admits to having right parietal headaches going on for a few years with not much worsening of pattern over time.  Claims to have seen neurology and MRI of brain performed which was unrevealing.  Headaches come and go and are self-limiting.    Patient denies having any jaw claudication, visual disturbances, scalp tenderness, fevers or constitutional symptoms.    Given patient's age, mildly elevated sed rate, headaches present for few years and lack of other associated symptoms seen with temporal arteritis (except for possible PMR), my clinical suspicion is not very high at this time.     Patient adds that she is also been noticed some alopecia, has not seen dermatology.    Noted to have mild leukopenia.    Noted to have negative/unremarkable: LOVE, rheumatoid factor, Lyme antibody.    OTC NSAIDs and Tylenol have not been helpful.    Denies  history of diabetes.    No clear signs of synovitis noted on exam today.    Please see below for management plan.      Pertinent rheumatology/past medical history (please refer to above for more detailed history):      Rule out PMR    Shoulder girdle/neck discomfort and tightness    Leukopenia (mild)    Alopecia    Right-sided parietal headaches (chronic, few years)      Rheumatology medications provided/suggested:    Prednisone      Pertinent medication from other providers or from otc (please refer to above for more detailed med list):    Flexeril  Vitamin D  Iron  Prilosec      Pertinent medications already tried:     Ibuprofen (ineffective)  Tylenol (ineffective)      Pertinent lab history:    Noted to have negative/unremarkable: LOVE, rheumatoid factor, Lyme antibody    Mildly elevated ESR      Pertinent imaging/test history:    MRI of brain was unrevealing.    MRI of cervical spine from 4/2016 showed moderate left foraminal C5-C6 disc extrusion with moderate stenosis of left foraminal inlet.  Correlate for a left C6 radicular pain.  No other neural foraminal stenosis.  No spinal canal stenosis.    MRI of thoracic spine from 11/2018 showed normal MRI of thoracic spine.    MRI of lumbar spine from 11/2018 showed mild annular bulges at L3-L4, L4-L5 and L5-S1 without resulting central spinal canal or neural foraminal stenosis.      Other:    Originally from [facility] has been in the [place].  , has 3 children.  Currently unemployed.    Denies regular alcohol beverage intake or tobacco use.    Currently not using any contraceptive.    Standing order for labs placed every 3 months good through December 2020, only to be drawn if patient is still on prednisone.        Plan:      Had lengthy discussion with the patient and daughter regarding her chronic right parietal headaches in context of possibly having PMR contributing to shoulder girdle/neck pains.  Suggest that she have a right temporal artery biopsy  performed which she deferred.  Patient would rather just treat for possible PMR at this time.  Patient made aware and also stated on her AVS that if she develops any persistent double vision, loss of visual field or painful vision she should seek immediate medical attention at an emergency room for higher dosing of steroids, as if experiencing active temporal arteritis this condition can potentially lead to complication of blindness particularly if above symptoms are present.  Patient expressed understanding and agreement.    We will try trial of treating for presumed PMR with prednisone 20 mg daily, decrease by 2.5 mg monthly until/remaining on 10 mg daily.  Handout on prednisone provided to the patient.  Patient advised to contact us in 1 to 2 weeks with update regarding response to prednisone, based on response will consider treating over 18 month course versus tapering to off.    Will obtain x-rays of: Cervical spine and bilateral shoulders.    Will obtain some labs today correlate clinically.    Recheck labs prior to next visit.    Follow-up in 3 months.      Procedure note:       Spent 60 minutes with greater than half of this time spent with the patient going over differential diagnosis, prognosis, treatment plan, medication side effects and  answering questions.    Patient accompanied by daughter who speaks fluent English to this visit.    Major side effect profile of medications provided/suggested were discussed with the patient.  Handout on prednisone provided to the patient.    This note was transcribed using Dragon voice recognition software as a result unintentional grammatical errors or word substitutions may have occurred. Please contact our Rheumatology department if you need any clarification or if you have any related inquiries.    Thank you for referring this patient to our clinic.      Jonatan Mccormick DO....................  12/9/2019   11:38 AM

## 2021-06-05 NOTE — TELEPHONE ENCOUNTER
Refill Approved    Rx renewed per Medication Renewal Policy. Medication was last renewed on 9/29/19.    Deann Borja, [facility] Triage/Med Refill 1/12/2020     Requested Prescriptions   Pending Prescriptions Disp Refills     ferrous sulfate 325 (65 FE) MG tablet [Pharmacy Med Name: FERROUS SULFATE 325MG (5GR) TABS] 30 tablet 2     Sig: TAKE 1 TABLET(325 MG) BY MOUTH DAILY WITH BREAKFAST       Iron Supplements Refill Protocol Passed - 1/9/2020  3:16 AM        Passed - PCP or prescribing provider visit in past 12 months       Last office visit with prescriber/PCP: 10/8/2019 Daphney Conde CNP OR same dept: 10/8/2019 Daphney Conde CNP OR same specialty: 10/8/2019 Daphney Conde CNP  Last physical: Visit date not found Last MTM visit: Visit date not found   Next visit within 3 mo: Visit date not found  Next physical within 3 mo: Visit date not found  Prescriber OR PCP: Daphney Conde CNP  Last diagnosis associated with med order: 1. Iron deficiency  - ferrous sulfate 325 (65 FE) MG tablet [Pharmacy Med Name: FERROUS SULFATE 325MG (5GR) TABS]; TAKE 1 TABLET(325 MG) BY MOUTH DAILY WITH BREAKFAST  Dispense: 30 tablet; Refill: 2    If protocol passes may refill for 12 months if within 3 months of last provider visit (or a total of 15 months).             Passed - Hemoglobin or Hematocrit in last 12 months     Hemoglobin   Date Value Ref Range Status   09/24/2019 12.1 12.0 - 16.0 g/dL Final     Hematocrit   Date Value Ref Range Status   09/24/2019 36.4 35.0 - 47.0 % Final

## 2021-06-07 NOTE — TELEPHONE ENCOUNTER
Refill Approved    Rx renewed per Medication Renewal Policy. Medication was last renewed on 9/24/19.    Jenni Ahumada, [facility] Triage/Med Refill 4/23/2020     Requested Prescriptions   Pending Prescriptions Disp Refills     omeprazole (PRILOSEC) 40 MG capsule [Pharmacy Med Name: OMEPRAZOLE 40MG CAPSULES] 90 capsule 2     Sig: TAKE 1 CAPSULE(40 MG) BY MOUTH DAILY       GI Medications Refill Protocol Passed - 4/23/2020  5:11 PM        Passed - PCP or prescribing provider visit in last 12 or next 3 months.     Last office visit with prescriber/PCP: 10/8/2019 Daphney Conde CNP OR same dept: 10/8/2019 Daphney Conde CNP OR same specialty: 10/8/2019 Daphney Conde CNP  Last physical: Visit date not found Last MTM visit: Visit date not found   Next visit within 3 mo: Visit date not found  Next physical within 3 mo: Visit date not found  Prescriber OR PCP: Daphney Conde CNP  Last diagnosis associated with med order: 1. Gastroesophageal reflux disease without esophagitis  - omeprazole (PRILOSEC) 40 MG capsule [Pharmacy Med Name: OMEPRAZOLE 40MG CAPSULES]; TAKE 1 CAPSULE(40 MG) BY MOUTH DAILY  Dispense: 90 capsule; Refill: 2    If protocol passes may refill for 12 months if within 3 months of last provider visit (or a total of 15 months).

## 2021-06-08 NOTE — PROGRESS NOTES
"Ana Cristina is a 47 y.o. female presenting to the clinic for follow-up on labs.  Patient was evaluated on 12/22/16 with concerns of fatigue for 3 months.  Hemogram showed a hemoglobin of 9.1, hematocrit of 29.8.  Peripheral blood smear showed microcytic, hypochromic anemia.  Iron level was 13.  Vitamin D was low at 23.3.  It was suggested for her to take 2000 units of vitamin D3 daily.  Sed rate was elevated at 30.  Patient started on iron supplement twice daily.  Patient states she finished it 2 days ago.  Her LMP was on 2/11/17.  Her menstrual periods last for 7 days.  She denies heavy menses.  She has not had any abdominal pain.  She denies diarrhea, constipation, blood or mucus in the stool.  She has changed her diet and is now consuming whole grains, vegetables, brown rice and fruits.  If she consumes meat, it is fish or chicken.  Patient states her hair is now falling out.  Her nails are brittle.  She states she has been experiencing myalgias and feels as though she was \"beat up by the morning.\"    Review of Systems: A complete 14 point review of systems was obtained and is negative or as stated in the history of present illness.    Social History     Social History     Marital status:      Spouse name: N/A     Number of children: N/A     Years of education: N/A     Occupational History     Not on file.     Social History Main Topics     Smoking status: Never Smoker     Smokeless tobacco: Not on file     Alcohol use No     Drug use: Not on file     Sexual activity: Not on file     Other Topics Concern     Not on file     Social History Narrative    Patient is right hand dominant.                Active Ambulatory Problems     Diagnosis Date Noted     Arthropathy Of The Shoulder Region      Fatigue      Vitamin D Deficiency      Hyperlipidemia      Lower Back Pain      Hematuria      Esophageal Reflux      Cervical disc herniation 04/29/2016     Resolved Ambulatory Problems     Diagnosis Date Noted     Limb " "Pain      Chest Pain      Vaginal Discharge      Pain During Urination (Dysuria)      Abdominal pain      Past Medical History:   Diagnosis Date     Arthropathy Of The Shoulder Region      Chest pain      Esophageal reflux      Hyperlipidemia        No family history on file.    OBJECTIVE:     Visit Vitals     /62 (Patient Site: Right Arm, Patient Position: Sitting, Cuff Size: Adult Regular)     Pulse (!) 50     Ht 5' 4\" (1.626 m)     Wt 172 lb 4.8 oz (78.2 kg)     BMI 29.58 kg/m2       Patient is alert, in no obvious distress.   Skin: Warm, dry.  No lesions or rashes.  Skin turgor rapid return.   HEENT:  Head normocephalic, atraumatic.  Eyes normal. Ears normal.  Nose patent, mucosa pink.  Oropharynx mucosa pink.  No lesions or tonsillar enlargement.   Neck: Supple, no lymphadenopathy.   Lungs:  Clear to auscultation. Respirations even and unlabored.  No wheezing or rales noted.   Heart:  Regular rate and rhythm.  No murmurs, S3, S4, gallops, or rubs.    Abdomen: Soft, nontender.  No organomegaly. Bowel sounds normoactive. No guarding or masses noted.   Musculoskeletal:  Full ROM of extremities.  DTRs symmetrical, sensations intact.  No obvious deformity.  Muscle strength equal +5/5.   Neurological:  Cranial nerves 2-12 intact.      ASSESSMENT AND PLAN:     1. Iron deficiency anemia  HM2(CBC w/o Differential)    Iron and Transferrin Iron Binding Capacity    Ferritin   2. Elevated sed rate  Sedimentation Rate   3. Myalgia  Antinuclear Antibody (LOVE) [place]    Rheumatoid Factor Quant   4. Vitamin D deficiency  Vitamin D, Total (25-Hydroxy)     Provided information on foods higher in iron.  Patient has adjusted her diet and this may be contributing.  We'll rule out autoimmune disease due to myalgias.  We'll check vitamin D level.  She has been taking 1000 units of vitamin D 3 daily.  Further plans regarding the results.  May consider referral to hematologist if no improvement with oral iron.  She is content " with the plan. I spent 25 minutes with the patient with greater than 50% spent discussing symptoms, treatment options, and coordination of care.

## 2021-06-10 NOTE — TELEPHONE ENCOUNTER
RN cannot approve Refill Request    RN can NOT refill this medication med is not covered by policy/route to provider. Last office visit: 10/8/2019 Daphney Conde CNP Last Physical: Visit date not found Last MTM visit: Visit date not found Last visit same specialty: 10/8/2019 Daphney Conde CNP.  Next visit within 3 mo: Visit date not found  Next physical within 3 mo: Visit date not found      Jagruti Nunez, [facility] Triage/Med Refill 8/5/2020    Requested Prescriptions   Pending Prescriptions Disp Refills     cyclobenzaprine (FLEXERIL) 5 MG tablet [Pharmacy Med Name: CYCLOBENZAPRINE 5MG TABLETS] 30 tablet 1     Sig: TAKE 1 TO 2 TABLETS(5 TO 10 MG) BY MOUTH THREE TIMES DAILY AS NEEDED FOR MUSCLE SPASMS       There is no refill protocol information for this order

## 2021-06-10 NOTE — PROGRESS NOTES
Ana Cristina is a 47 y.o. female presenting to the clinic for multiple concerns.  Patient has been experiencing headaches 3 days per week.  She describes the headaches as being within the right parietal region.  She complains of a stiffness within her neck when the headaches occur.  She describes the pain as a sharp sensation.  She does have nausea without vomiting.  She denies blurry vision.  She denies numbness and tingling of her extremities and muscular weakness.  She has been taking Tylenol and Excedrin which helps minimally. She occasionally has dizziness when she moves from a sitting to a standing position.  This lasts for a few seconds and resolves on its own. Patient continues to experience myalgias.  She has iron deficiency anemia and vitamin D deficiency.  She is taking an iron supplement twice daily.  She is also taking vitamin D 50,000 units once weekly.  She has 4 weeks of the high-dose Vitamin  left and then plans on taking 2000 units of vitamin D3 daily.  She wakes up in the morning with stiffness within her joints.  Rheumatoid factor and LOVE were negative.      Review of Systems: A complete 14 point review of systems was obtained and is negative or as stated in the history of present illness.    Social History     Social History     Marital status:      Spouse name: N/A     Number of children: N/A     Years of education: N/A     Occupational History     Not on file.     Social History Main Topics     Smoking status: Never Smoker     Smokeless tobacco: Not on file     Alcohol use No     Drug use: Not on file     Sexual activity: Not on file     Other Topics Concern     Not on file     Social History Narrative    Patient is right hand dominant.                Active Ambulatory Problems     Diagnosis Date Noted     Arthropathy Of The Shoulder Region      Fatigue      Vitamin D Deficiency      Hyperlipidemia      Lower Back Pain      Hematuria      Esophageal Reflux      Cervical disc herniation  04/29/2016     Resolved Ambulatory Problems     Diagnosis Date Noted     Limb Pain      Chest Pain      Vaginal Discharge      Pain During Urination (Dysuria)      Abdominal pain      Past Medical History:   Diagnosis Date     Arthropathy Of The Shoulder Region      Chest pain      Esophageal reflux      Hyperlipidemia        No family history on file.    OBJECTIVE:     /64 (Patient Site: Right Arm, Patient Position: Sitting, Cuff Size: Adult Regular)  Pulse 63  Wt 171 lb (77.6 kg)  SpO2 99%  BMI 29.35 kg/m2    Patient is alert, in no obvious distress.   Skin: Warm, dry.  No lesions or rashes.  Skin turgor rapid return.   HEENT:  Head normocephalic, atraumatic.  Eyes normal.  PERRL.  EOM's intact.  No nystagmus. Ears normal.  Nose patent, mucosa pink.  Oropharynx mucosa pink.  No lesions or tonsillar enlargement.   Neck: Supple, no lymphadenopathy, JVD, bruits noted.  No thyromegaly.  Lungs:  Clear to auscultation. Respirations even and unlabored.  No wheezing or rales noted.   Heart:  Regular rate and rhythm.  No murmurs, S3, S4, gallops, or rubs.    Musculoskeletal:  Full ROM of extremities.  DTRs symmetrical, sensations intact.  No obvious deformity.  Muscle strength equal +5/5.   Neurological:  Cranial nerves 2-12 intact.      ASSESSMENT AND PLAN:     1. Acute non intractable tension-type headache  cyclobenzaprine (FLEXERIL) 5 MG tablet   2. Neck pain     3. Polymyalgia  Lyme Antibody [place]    Ambulatory referral to Rheumatology   4. Iron deficiency anemia  HM2(CBC w/o Differential)    Iron and Transferrin Iron Binding Capacity   5. Vitamin D deficiency       Suspect headache is tension versus migraine.  Due to the neck pain, I am leaning more towards a tension headache.  Will treat with cyclobenzaprine as needed.  She is to avoid taking other sedatives at this.  She will continue over-the-counter ibuprofen and Tylenol.  If no improvement symptoms, may consider sumatriptan as an option.  Patient  continues to experience polymyalgias.  I am which unsure if this is related to the vitamin D deficiency.  Will rule out Lyme's and refer to rheumatology.  Patient is also experiencing iron deficiency anemia.  She is taking iron supplementation.  Will check hemogram and iron results and notify patient.  May consider referral to hematology if it persists.  She is content with the plan.

## 2021-06-12 NOTE — PROGRESS NOTES
Assessment/Plan:     1. UTI (urinary tract infection)     2. Back pain  Urinalysis-UC if Indicated    Culture, Urine   3. Gastroesophageal reflux disease without esophagitis  omeprazole (PRILOSEC) 40 MG capsule       Diagnoses and all orders for this visit:    UTI (urinary tract infection)  -Urine is negative for nitrites but shows trace leukocytes and presence of some bacteria.  We will send for culture.  In the meantime we will start her on Bactrim DS twice daily for 3 days.  Counseled on use of medication and common side effects.  Should follow-up if symptoms worsening or not improving.  Will notify of culture results when available.    Back pain  -     Urinalysis-UC if Indicated  -     Culture, Urine  -Could be musculoskeletal in nature.  Provided refill on ibuprofen.    Gastroesophageal reflux disease without esophagitis  -     omeprazole (PRILOSEC) 40 MG capsule; Take 1 capsule (40 mg total) by mouth daily.  Dispense: 90 capsule; Refill: 2    Other orders  -     ibuprofen (ADVIL,MOTRIN) 800 MG tablet; Take 1 tablet (800 mg total) by mouth every 6 (six) hours as needed for pain.  Dispense: 30 tablet; Refill: 1  -     sulfamethoxazole-trimethoprim (BACTRIM DS) 800-160 mg per tablet; Take 1 tablet by mouth 2 (two) times a day for 3 days.  Dispense: 6 tablet; Refill: 0               Subjective:      Ana Cristina Mc is a 47 y.o. female who comes in today with concern about back pain.  She noticed some pain in her lower back last evening.  Initially sharp in nature but now more of a burning discomfort.  This radiates around into her lower abdomen.  She has not had dysuria but has had some urinary frequency and urgency as well as some nausea.  No vomiting.  No fevers or chills.  Has had a little bit of vaginal discharge but it is not bothersome or irritating.  She has been taking Tylenol and ibuprofen with only minimal relief of symptoms.  She had similar symptoms in the past when she had urinary tract infection  so she is concerned that this may be the cause of her symptoms today.  Has not had hematuria.  We reviewed medications and allergies.  Remainder of review of systems is otherwise negative.  No other concerns.    Current Outpatient Prescriptions   Medication Sig Dispense Refill     albuterol (PROVENTIL HFA;VENTOLIN HFA) 90 mcg/actuation inhaler Inhale 2 puffs every 4 (four) hours as needed. 1 Inhaler 0     cyclobenzaprine (FLEXERIL) 5 MG tablet TAKE 1-2 TABLETS BY MOUTH THREE TIMES DAILY AS NEEDED FOR MUSCLE SPASM 30 tablet 0     ferrous sulfate 325 (65 FE) MG tablet Take 1 tablet (325 mg total) by mouth 2 (two) times a day. 60 tablet 3     ibuprofen (ADVIL,MOTRIN) 800 MG tablet Take 1 tablet (800 mg total) by mouth every 6 (six) hours as needed for pain. 30 tablet 1     omeprazole (PRILOSEC) 40 MG capsule Take 1 capsule (40 mg total) by mouth daily. 90 capsule 2     Q-PAP EXTRA STRENGTH 500 mg tablet as needed.   1     sulfamethoxazole-trimethoprim (BACTRIM DS) 800-160 mg per tablet Take 1 tablet by mouth 2 (two) times a day for 3 days. 6 tablet 0     No current facility-administered medications for this visit.        Past Medical History, Family History, and Social History reviewed.  Past Medical History:   Diagnosis Date     Arthropathy Of The Shoulder Region     Created by Conversion      Chest pain      Esophageal reflux     Created by Conversion      Hyperlipidemia     Created by Conversion      No past surgical history on file.  Review of patient's allergies indicates no known allergies.  No family history on file.  Social History     Social History     Marital status:      Spouse name: N/A     Number of children: N/A     Years of education: N/A     Occupational History     Not on file.     Social History Main Topics     Smoking status: Never Smoker     Smokeless tobacco: Never Used     Alcohol use No     Drug use: Not on file     Sexual activity: Not on file     Other Topics Concern     Not on file      Social History Narrative    Patient is right hand dominant.                  Review of systems is as stated in HPI, and the remainder of the 10 system review is otherwise negative.    Objective:     Vitals:    07/21/17 1530   BP: 118/72   Patient Site: Left Arm   Patient Position: Sitting   Cuff Size: Adult Regular   Pulse: 72   Temp: 98.6  F (37  C)   TempSrc: Tympanic   SpO2: 100%   Weight: 168 lb 8 oz (76.4 kg)    Body mass index is 28.92 kg/(m^2).    General appearance: alert, appears stated age and cooperative  Head: Normocephalic, without obvious abnormality, atraumatic  Neck: supple, symmetrical, trachea midline  Lungs: clear to auscultation bilaterally  Heart: regular rate and rhythm, S1, S2 normal, no murmur, click, rub or gallop  Abdomen: soft, non-tender; bowel sounds normal; no masses,  no organomegaly  Musculoskeletal: There is some mild tenderness across the lower back in the lumbar paraspinous muscles.  No CVA tenderness present      Recent Results (from the past 168 hour(s))   Urinalysis-UC if Indicated   Result Value Ref Range    Color, UA Yellow Colorless, Yellow, Straw, Light Yellow    Clarity, UA Clear Clear    Glucose, UA Negative Negative    Bilirubin, UA Negative Negative    Ketones, UA Negative Negative    Specific Gravity, UA <=1.005 1.005 - 1.030    Blood, UA Trace (!) Negative    pH, UA 6.5 5.0 - 8.0    Protein, UA Negative Negative mg/dL    Urobilinogen, UA 0.2 E.U./dL 0.2 E.U./dL, 1.0 E.U./dL    Nitrite, UA Negative Negative    Leukocytes, UA Trace (!) Negative    Bacteria, UA Few (!) None Seen hpf    RBC, UA 0-2 None Seen, 0-2 hpf    WBC, UA 0-5 None Seen, 0-5 hpf    Squam Epithel, UA 0-5 None Seen, 0-5 lpf       This note has been dictated using voice recognition software. Any grammatical or context distortions are unintentional and inherent to the the software.

## 2021-06-16 NOTE — PROGRESS NOTES
Assessment and Plan:     1. Right upper quadrant abdominal pain  Differentials include cholelithiasis, pancreatitis, gastritis, GERD, nephrolithiasis.  Will check hemogram, CMP, lipase, urinalysis.  Will obtain abdominal ultrasound to rule out cholelithiasis.  Discussed avoidance of fatty foods and NSAIDs.  Further plans pending the results.  May consider referral to gastroenterology if labs are normal and symptoms persist.  She is content with the plan.  - Urinalysis-UC if Indicated  - HM2(CBC w/o Differential)  - Comprehensive Metabolic Panel  - Lipase  - US Abdomen Limited; Future    Subjective:     Ana Cristina is a 48 y.o. female presenting to the clinic for concerns for right upper quadrant abdominal pain for 2 days.  Patient complains of an intermittent cramping sensation that lasts for a few hours.  She has had nausea without vomiting.  She complains of lack of appetite.  She has not noticed any improvement or exacerbation of symptoms with the consumption of food.  She did have a bowel movement yesterday which was softer.  She typically has bowel movements on a daily basis.  She has not had a bowel movement today.  She denies any heartburn.  She has not been taking any ibuprofen or Aleve.  She took Tylenol with no relief.  She denies any alcohol consumption.  Her last menstrual period was 2 days ago.  She denies dysuria, urinary urgency, urinary frequency, hematuria, and fever.  She had a normal right upper quadrant abdominal ultrasound on 4/27/14.    Review of Systems: A complete 14 point review of systems was obtained and is negative or as stated in the history of present illness.    Social History     Social History     Marital status:      Spouse name: N/A     Number of children: N/A     Years of education: N/A     Occupational History     Not on file.     Social History Main Topics     Smoking status: Never Smoker     Smokeless tobacco: Never Used     Alcohol use No     Drug use: Not on file     Sexual  "activity: Not on file     Other Topics Concern     Not on file     Social History Narrative    Patient is right hand dominant.                Active Ambulatory Problems     Diagnosis Date Noted     Arthropathy Of The Shoulder Region      Fatigue      Vitamin D Deficiency      Hyperlipidemia      Lower Back Pain      Hematuria      Esophageal Reflux      Cervical disc herniation 04/29/2016     Resolved Ambulatory Problems     Diagnosis Date Noted     Limb Pain      Chest Pain      Vaginal Discharge      Pain During Urination (Dysuria)      Abdominal pain      Past Medical History:   Diagnosis Date     Arthropathy Of The Shoulder Region      Chest pain      Esophageal reflux      Hyperlipidemia        No family history on file.    Objective:     /60  Pulse 64  Ht 5' 4\" (1.626 m)  Wt 166 lb 8 oz (75.5 kg)  BMI 28.58 kg/m2    Patient is alert, in no obvious distress.   Skin: Warm, dry.   Lungs:  Clear to auscultation. Respirations even and unlabored.  No wheezing or rales noted.   Heart:  Regular rate and rhythm.  No murmurs, S3, S4, gallops, or rubs.    Abdomen: Soft, tenderness to palpation right upper abdomen.  No organomegaly. Bowel sounds normoactive. No guarding or masses noted.               "

## 2021-06-16 NOTE — PROGRESS NOTES
Assessment and Plan:     1. Chronic nonintractable headache, unspecified headache type  Data right eye visual changes, will obtain MRI of the brain for further evaluation.  Differentials include migraine and tension headaches.  Will treat with sumatriptan 25 mg as needed at the onset of the headache.  She is tried cyclobenzaprine in the past with no relief.  If symptoms persist or worsen, will refer to neurology.  She is to see ophthalmology.  - MR Brain With Without Contrast; Future  - SUMAtriptan (IMITREX) 25 MG tablet; Take 1 tablet (25 mg total) by mouth every 2 (two) hours as needed for migraine.  Dispense: 10 tablet; Refill: 0  - ibuprofen (ADVIL,MOTRIN) 800 MG tablet; Take 1 tablet (800 mg total) by mouth every 6 (six) hours as needed for pain.  Dispense: 30 tablet; Refill: 1    2. Decreased visual acuity  - Ambulatory referral to Ophthalmology    3. Polymyalgia  Patient has a history of this in the past.  Autoimmune labs have been normal.  Will recheck sed rate and CK.  - Sedimentation Rate  - CK Total    4. Fatigue, unspecified type  We will rule out diabetes, thyroid disease, vitamin deficiencies.  She has a history of iron deficiency anemia.  She is not taking iron supplementation.  Will notify patient of results. She is content with the plan.   - HM2(CBC w/o Differential)  - Glycosylated Hemoglobin A1c  - Thyroid Stimulating Hormone (TSH)  - T4, Free  - Vitamin D, Total (25-Hydroxy)  - Vitamin B12  - Ferritin  - Iron and Transferrin Iron Binding Capacity    5. Visit for screening mammogram  - Mammo Screening Bilateral; Future    Subjective:     Ana Cristina is a 48 y.o. female presenting to the clinic for multiple concerns today.  Patient states she has been experiencing intermittent headaches since 2000.  She describes the headache as being within the right parietal region and occipital.  Patient states over the past 2 months, her headache has worsened.  She is experiencing headache every 2-3 days.  Patient  "feels as though she is losing vision within her right eye.  She feels as though there is something covering her eye.  She has had nausea without vomiting.  She denies numbness and tingling of her extremities and muscular weakness.  She has tried cyclobenzaprine with minimal relief. She does complain of generalized body aches.  She has a history of anemia in the past.  She is not taking an iron supplement currently.  She complains of fatigue and has been losing her hair.  She feels as though her nails are brittle.    Review of Systems: A complete 14 point review of systems was obtained and is negative or as stated in the history of present illness.    Social History     Social History     Marital status:      Spouse name: N/A     Number of children: N/A     Years of education: N/A     Occupational History     Not on file.     Social History Main Topics     Smoking status: Never Smoker     Smokeless tobacco: Never Used     Alcohol use No     Drug use: No     Sexual activity: Not on file     Other Topics Concern     Not on file     Social History Narrative    Patient is right hand dominant.                Active Ambulatory Problems     Diagnosis Date Noted     Arthropathy Of The Shoulder Region      Fatigue      Vitamin D Deficiency      Hyperlipidemia      Lower Back Pain      Hematuria      Esophageal Reflux      Cervical disc herniation 04/29/2016     Resolved Ambulatory Problems     Diagnosis Date Noted     Limb Pain      Chest Pain      Vaginal Discharge      Pain During Urination (Dysuria)      Abdominal pain      Past Medical History:   Diagnosis Date     Arthropathy Of The Shoulder Region      Chest pain      Esophageal reflux      Hyperlipidemia        No family history on file.    Objective:     BP 92/58  Pulse 68  Ht 5' 2.5\" (1.588 m)  Wt 161 lb 8 oz (73.3 kg)  BMI 29.07 kg/m2    Patient is alert, in no obvious distress.   Skin: Warm, dry.  No lesions or rashes.  Skin turgor rapid return.   HEENT: "  Head normocephalic, atraumatic.  Eyes normal.  PERRL.  EOM's intact.  No nystagmus. Ears normal.  Nose patent, mucosa pink.  Oropharynx mucosa pink.  No lesions or tonsillar enlargement.   Neck: Supple, no lymphadenopathy.  No thyromegaly.  Lungs:  Clear to auscultation. Respirations even and unlabored.  No wheezing or rales noted.   Heart:  Regular rate and rhythm.  No murmurs, S3, S4, gallops, or rubs.    Abdomen: Soft, nontender.  No organomegaly. Bowel sounds normoactive. No guarding or masses noted.   Musculoskeletal:  Full ROM of extremities.  DTRs symmetrical, sensations intact.  No obvious deformity.  Muscle strength equal +5/5.   Neurological:  Cranial nerves 2-12 intact.

## 2021-06-16 NOTE — PROGRESS NOTES
HPI:   Ana Cristina Mc is a 48 y.o. female referred to see me by Daphney Conde CNP for symptomatic cholelithiasis.  She notes a 1 month history of right upper quadrant pain described as crampy.  This started last month.  She had 3 days in a row of intermittent right upper quadrant pain that radiated to her back.  The pain was associated with a decreased appetite.  She has had a few more smaller, less severe episodes since.  She has not noticed any association with foods.  The pain would go away on its own.  Denies any nausea, vomiting, fevers, chills, juandice or any other symptoms at present.       Allergies:  Review of patient's allergies indicates no known allergies.    Past medical history:  Neck pain and vitamin D deficiency    Past surgical history:  None    Current medications:    Current Outpatient Prescriptions:      cholecalciferol, vitamin D3, 1,000 unit tablet, Take 1,000 Units by mouth daily., Disp: , Rfl:      cyclobenzaprine (FLEXERIL) 5 MG tablet, TAKE 1-2 TABLETS BY MOUTH THREE TIMES DAILY AS NEEDED FOR MUSCLE SPASM, Disp: 30 tablet, Rfl: 0     ibuprofen (ADVIL,MOTRIN) 800 MG tablet, Take 1 tablet (800 mg total) by mouth every 6 (six) hours as needed for pain., Disp: 30 tablet, Rfl: 1     multivitamin therapeutic tablet, Take 1 tablet by mouth daily., Disp: , Rfl:     Family history:  Both parents are alive and healthy to her knowledge    Social history:   reports that she has never smoked. She has never used smokeless tobacco. She reports that she does not drink alcohol or use illicit drugs.    Review of Systems:  General: No complaints or constitutional symptoms  Skin: No complaints or symptoms   Hematologic/Lymphatic: No symptoms or complaints  Psychiatric: No symptoms or complaints  Endocrine: No excessive fatigue, no hypermetabolic symptoms reported  Respiratory: No cough, shortness of breath, or wheezing  Cardiovascular: No chest pain or dyspnea on exertion  Gastrointestinal: As per  "HPI  Musculoskeletal: No recent injuries reported  Neurological: No focal neurologic defects reported.      EXAM:  /79 (Patient Site: Right Arm, Patient Position: Sitting, Cuff Size: Adult Regular)  Pulse 65  Ht 5' 2.5\" (1.588 m)  Wt 159 lb 11.2 oz (72.4 kg)  Breastfeeding? No  BMI 28.74 kg/m2  Body mass index is 28.74 kg/(m^2).  General : Alert, cooperative, appears stated age   Skin: Skin color, texture, turgor normal, no rashes or lesions   Lymphatic: No obvious adenopathy, no swelling   Eyes: No scleral icterus, pupils equal  HENT: No traumatic injury to the head or face, no gross abnormalities  Lungs: Normal respiratory effort, breath sounds equal bilaterally  Heart: Regular rate and rhythm  Abdomen: Soft, nondistended, and nontender to palpation  Musculoskeletal: No obvious swelling or deformities  Neurologic: Grossly intact    LABS:  Lab Results   Component Value Date    WBC 5.0 02/27/2018    WBC 4.9 03/23/2015    HGB 12.1 02/27/2018    HCT 36.3 02/27/2018    MCV 96 02/27/2018     02/27/2018         Lab Results   Component Value Date    ALT 14 02/27/2018    AST 20 02/27/2018    ALKPHOS 55 02/27/2018    BILITOT 0.8 02/27/2018       IMAGES:   Relevant images were reviewed and discussed with the patient.  Notable findings were:     US ABDOMEN LIMITED  3/2/2018 9:12 AM     INDICATION: RUQ abdominal pain r/o cholelithiasis  COMPARISON: None.     FINDINGS:  GALLBLADDER: There is a 1.1 cm nonmobile stone in the gallbladder neck. No sonographic Pickett sign or pericholecystic fluid.  BILE DUCTS: No bile duct dilation. The common bile duct measures 3 mm.  LIVER: There is a circumscribed hyperechoic rounded focus in the right lobe of liver measuring 0.8 x 0.7 x 0.8 cm most likely representing a small hemangioma. CT abdomen dated 11/07/2014. No other focal hepatic lesions.  RIGHT KIDNEY: Not well visualized secondary to bowel gas no gross hydronephrosis.  PANCREAS: Not imaged in detail on this limited " study. No incidental abnormalities.     No ascites in the right upper quadrant.     IMPRESSION:   CONCLUSION:  1.  Cholelithiasis with nonmobile gallstone in the gallbladder neck. No pericholecystic fluid or sonographic Pickett sign.  2.  8 mm circumscribed smoothly marginated hyperechoic focus right lobe liver likely representing a small hemangioma. If clinically indicated this could be confirmed with hepatic CT or MR.    Assessment/Plan:   1. Calculus of gallbladder without cholecystitis without obstruction        Ana Cristina Mc is a 48 y.o. female with signs and symptoms consistent with symptomatic cholelithiasis.  I have explained the pathophysiology of gallbladder disease in detail as well as the surgical versus non-operative management strategies.      The risks of surgery were discussed in detail which include, but are not limited to, bleeding, infection, injury to surrounding structures, the need to convert to an open procedure, blood clots, stroke, heart attack and death.  Specifically we discussed the risk of damage to the common bile duct and hepatic vessels, and the complications that may arise from damage to these structures.  Additionally, the risks of non operative management were discussed which include, but are not limited to, worsening infection, increased pain, sepsis and death.  We also discussed postoperative recovery and restrictions.    She understands everything which was discussed and would like to take some time to discuss surgery with her .  Our contact information has been provided and she will call the surgery scheduler if she would like to proceed with surgery.       Debbie Stauffer, [facility]  [phone]

## 2021-06-16 NOTE — TELEPHONE ENCOUNTER
Telephone Encounter by Francisco Hawkins CMA at 10/22/2019  9:05 AM     Author: Francisco Hawkins CMA Service: -- Author Type: Certified Medical Assistant    Filed: 10/22/2019  9:06 AM Encounter Date: 10/21/2019 Status: Signed    : Francisco Hawkins CMA (Certified Medical Assistant)       Patient Returning Call  Reason for call:  Return call.  Information relayed to patient:  Patient was informed of Daphney Conde CNP's message below about her refill request for the vitamin D.  Patient has additional questions:  No  If YES, what are your questions/concerns:  n/a  Okay to leave a detailed message?: No call back needed         Daphney Conde CNP   to Daphney Conde Care Team Pool            9:32 PM   Her last Vitamin D was normal.  She can now take [id number] units of Vitamin D3 daily which she can buy OTC.  Thanks.

## 2021-06-16 NOTE — PROGRESS NOTES
Gallbladder education & surgery packet provided to pt.    Chanda Jimenez CMA ([facility])     Ph: [phone]    Fx: [phone]

## 2021-06-19 NOTE — LETTER
Letter by Daphney Conde CNP at      Author: Daphney Conde CNP Service: -- Author Type: --    Filed:  Encounter Date: 4/29/2019 Status: (Other)        [facility]  [address]  [place] [address]  [phone]         Ana Cristina Mc  [address]  [place] [id number]        04/29/19    Dear Ana Cristina Mc     At [facility] we care about your health and well-being. Your primary care provider is committed to ensuring you receive high quality care and has chosen a network of specialists to assist in providing that care. Recently Daphney Conde CNP referred you to [facility] for specialty care.       It is important to your overall health to follow through with the recommendation from your provider. Please call [facility] ([phone]) at your earliest convenience for assistance in scheduling an appointment. If you have already scheduled this appointment, please disregard this notice. Thank you for choosing [facility] for your healthcare needs.       Sincerely,       [facility] Specialty Scheduling   Daphney Conde CNP

## 2021-06-19 NOTE — LETTER
Letter by Daphney Conde CNP at      Author: Daphney Conde CNP Service: -- Author Type: --    Filed:  Encounter Date: 9/29/2019 Status: Signed         Ana Cirstina Mc  [address]  [place] [address]             September 29, 2019         Dear Ms. Mc,    Below are the results from your recent visit:    Resulted Orders   HM2(CBC w/o Differential)   Result Value Ref Range    WBC 3.9 (L) 4.0 - 11.0 thou/uL    RBC 4.27 3.80 - 5.40 mill/uL    Hemoglobin 12.1 12.0 - 16.0 g/dL    Hematocrit 36.4 35.0 - 47.0 %    MCV 85 80 - 100 fL    MCH 28.4 27.0 - 34.0 pg    MCHC 33.4 32.0 - 36.0 g/dL    RDW 12.9 11.0 - 14.5 %    Platelets 224 140 - 440 thou/uL    MPV 8.3 7.0 - 10.0 fL   Sedimentation Rate   Result Value Ref Range    Sed Rate 28 (H) 0 - 20 mm/hr   Vitamin D, Total (25-Hydroxy)   Result Value Ref Range    Vitamin D, Total (25-Hydroxy) 49.0 30.0 - 80.0 ng/mL    Narrative    Deficiency <10.0 ng/mL  Insufficiency 10.0-29.9 ng/mL  Sufficiency 30.0-80.0 ng/mL  Toxicity (possible) >100.0 ng/mL   Ferritin   Result Value Ref Range    Ferritin 8 (L) 10 - 130 ng/mL   Iron and Transferrin Iron Binding Capacity   Result Value Ref Range    Iron 58 42 - 175 ug/dL    Transferrin 301 212 - 360 mg/dL    Transferrin Saturation, Calculated 15 (L) 20 - 50 %    Transferrin IBC, Calculated 376 313 - 563 ug/dL   Antinuclear Antibody (LOVE) Cascade   Result Value Ref Range    LOVE Screen Cascade 1.1 <=2.9 U    Narrative    <1.0 negative  1.1-2.9 weakly positive  3.0-5.9 positive ( reflex)  > or=6.0 strongly positive   Rheumatoid Factor Quant   Result Value Ref Range    Rheumatoid Factor Quantitative <15.0 0 - 30 IU/mL   Glycosylated Hemoglobin A1c   Result Value Ref Range    Hemoglobin A1c 5.5 3.5 - 6.0 %   Celiac(Gluten)Antibody Panel ($$$)   Result Value Ref Range    Gliadin IgA 4.4 0.0-<7.0 U/mL    Gliadin IgG <0.4 0.0-<7.0 U/mL    Tissue Transglutaminase IgG AB <0.6 0.0-<7.0 U/mL    Tissue Transglutaminase IgA AB 0.2  0.0-<7.0 U/mL    Immunoglobulin A 165 65 - 400 mg/dL    Narrative      < 7 U/mL = Negative    7-10 U/mL = Equivocal    > 10 U/mL = Positive    Positive results for the tTG and/or gliadin antibodies indicate possible celiac disease and a small intestinal biopsy my be indicated. Antibody levels decrease in patients on gluten-free diets; therefore, negative results do not exclude celiac disease. Total serum IgA is measured to identify selective IgA deficiency, which is present in up to 10% of celiac disease patients. Such patients would have negative results on IgA assays, but may have positive results on IgG antibody assays.   Thyroid Cascade   Result Value Ref Range    TSH 0.43 0.30 - 5.00 uIU/mL       Your labs are normal except your ferritin is a little low which is suspicious for iron deficiency.  I recommend that you take an iron supplement once daily.  I sent this to the pharmacy.     Your sed rate remains elevated and your LOVE is weakly positive.  This can be concerning for an autoimmune disease.  I recommend you see the rheumatologist as we discussed.      Your white blood count is a little low. I recommend rechecking this in one month.     Please call with questions or contact us using [email or web address].    Sincerely,        Electronically signed by Daphney Conde CNP

## 2021-06-20 NOTE — PROGRESS NOTES
Chief Complaint   Patient presents with     Back Pain     lower back moves to low front abdomin  2 days         HPI      Patient is here for two days of moderate pain across lower back, going around across the lower abdomen. She reported mild intermittent nausea as well. The last 24 hrs she had increased urinary frequency. No dysuria, gross hematuria, fever, diarrhea. Her last bowel movement was 2 days ago. She has hx of  but no other abdominal surgeries. NO hx of renal calculus. Her LMP was 8/8.     ROS: Pertinent ROS noted in HPI.     No Known Allergies    Patient Active Problem List   Diagnosis     Arthropathy Of The Shoulder Region     Fatigue     Vitamin D Deficiency     Hyperlipidemia     Lower Back Pain     Hematuria     Esophageal Reflux     Cervical disc herniation       No family history on file.    Social History     Social History     Marital status:      Spouse name: N/A     Number of children: N/A     Years of education: N/A     Occupational History     Not on file.     Social History Main Topics     Smoking status: Never Smoker     Smokeless tobacco: Never Used     Alcohol use No     Drug use: No     Sexual activity: Not on file     Other Topics Concern     Not on file     Social History Narrative    Patient is right hand dominant.                  Objective:    Vitals:    18 1235   BP: 100/60   Pulse: 66   Temp: 98.2  F (36.8  C)   SpO2: 99%       Gen: well appearing, listening to music  CV: RRR, no M, R, G  Pulm: CTAB  Abd: normal bowel sounds, soft, slight tenderness to deep palpation across lower abdomen, no mass/HSM  MSK: normal inspection of back,no pain to palpation of back, no CVA tenderness. Normal gait.    Recent Results (from the past 24 hour(s))   Urinalysis-UC if Indicated   Result Value Ref Range    Color, UA Yellow Colorless, Yellow, Straw, Light Yellow    Clarity, UA Clear Clear    Glucose, UA Negative Negative    Bilirubin, UA Negative Negative    Ketones, UA  Negative Negative    Specific Gravity, UA <=1.005 1.005 - 1.030    Blood, UA Negative Negative    pH, UA 6.0 5.0 - 8.0    Protein, UA Negative Negative mg/dL    Urobilinogen, UA 0.2 E.U./dL 0.2 E.U./dL, 1.0 E.U./dL    Nitrite, UA Negative Negative    Leukocytes, UA Negative Negative   HM2(CBC w/o Differential)   Result Value Ref Range    WBC 4.2 4.0 - 11.0 thou/uL    RBC 4.00 3.80 - 5.40 mill/uL    Hemoglobin 12.7 12.0 - 16.0 g/dL    Hematocrit 37.5 35.0 - 47.0 %    MCV 94 80 - 100 fL    MCH 31.7 27.0 - 34.0 pg    MCHC 33.9 32.0 - 36.0 g/dL    RDW 11.5 11.0 - 14.5 %    Platelets 212 140 - 440 thou/uL    MPV 8.5 7.0 - 10.0 fL   ISTAT CHEM 7 ([facility] ONLY)   Result Value Ref Range    Sodium 140 136 - 145 mmol/L    Potassium 4.3 3.5 - 5.5 mmol/L    CO2 27 22 - 31 mmol/L    Chloride 103 98 - 107 mmol/L    Anion Gap, Calculation 10 5 - 18 mmol/L    Glucose 92 70 - 125 mg/dL    BUN 5 (L) 8 - 22 mg/dL    Creatinine 0.50 (L) 0.70 - 1.30 mg/dL         Abdominal pain - reassuring exam. Exam not c/w with acute/surgical abdomen. However, I did recommended CT study and patient declined, saying she would like to wait and see how her symptoms progress over the next 24-48 hrs. Close f/u as directed.   -     HM2(CBC w/o Differential)  -     ISTAT CHEM 7 ([facility] ONLY)    Urinary frequency  -     Urinalysis-UC if Indicated

## 2021-06-20 NOTE — PROGRESS NOTES
Assessment/Plan:     1. Acute midline low back pain with right-sided sciatica  - Ambulatory referral to Adult PT- Internal  - methylPREDNISolone (MEDROL DOSEPACK) 4 mg tablet; Take 1 tablet (4 mg total) by mouth daily for 6 days. Follow package directions  Dispense: 21 tablet; Refill: 0    2. Coccydynia    Patient with primarily coccydynia and pain with sitting.  She does play some symptoms of lumbar back pain with radiculopathy.  No known injury, therefore I do not think x-rays would be terribly beneficial at this point.  We discussed treating with a short course of steroids.  Prescribed methylprednisone.  She will avoid ibuprofen while she is taking this.  She can continue Tylenol and cyclobenzaprine as needed.  Recommend adjusting sitting position, and sitting on a very cushioned area.  Continue with heat application and warm baths.  I have also referred patient over to physical therapy if her symptoms do not improve.  Patient agrees to plan of care.    Subjective:     Ana Cristina Mc is a 48 y.o. female who presents complaints of midline low back pain and coccyx pain ×2 weeks.  Patient cannot recall any specific injury or fall.  Associated symptoms include and muscle aches.  Pain is worse with sitting.  She feels fine with walking and ambulating.  Denies any fevers or specific joint pain, swelling, or redness.  She recalls having this pain about 7 years ago after she had an epidural for her  section.  Patient endorses some radiating pain down the right leg with some intermittent numbness.  No numbness/tingling in the feet.  No saddle anesthesia or episodes of incontinence.  Patient has been sleeping well at night, and the pain does not keep her up.  She is voiding normally.  Over-the-counter treatments include ibuprofen and Tylenol.  These have not been terribly helpful.  She has also tried cyclobenzaprine.  Patient is applying warm compresses.      The following portions of the patient's history were  reviewed and updated as appropriate: allergies, current medications, past family history, past medical history, past social history, past surgical history and problem list.    Review of Systems  A comprehensive review of systems was performed and was otherwise negative    Objective:     /62 (Patient Site: Right Arm, Patient Position: Sitting, Cuff Size: Adult Regular)  Pulse (!) 58  Temp 97.9  F (36.6  C) (Oral)   Wt 171 lb 14.4 oz (78 kg)  LMP 09/05/2018 (Exact Date)  BMI 30.94 kg/m2    General Appearance: Alert, cooperative, no distress, appears stated age  Back: Lumbar spine is straight and nontender. Pain indicated at the coccyx, but unable to elicit pain with palpation. LE strength equal bilaterally. SLR negative bilaterally. Patellar reflexes 2/4 bilaterally. Heel and toe walk intact.       Mai Payne, NP-C

## 2021-06-21 NOTE — PROGRESS NOTES
ASSESSMENT/PLAN:  1. URI (upper respiratory infection)  48-year-old with upper respiratory infection.  Discussed symptomatic treatment.  Offered Cheratussin AC to be taken 5 cc twice daily as needed for cough.  We discussed that this can be sedating and she should not drive while taking this medication.  She will follow-up with any point she develops fevers, the cough worsens or she develops any respiratory distress.      Patient Instructions   If you develop a fever, if your cough is lasting longer than 10 days, or if you feel increased shortness of breath or pain in your chest, return to clinic for evaluation.       No orders of the defined types were placed in this encounter.    Medications Discontinued During This Encounter   Medication Reason     cholecalciferol, vitamin D3, 1,000 unit tablet Therapy completed       Return if symptoms worsen or fail to improve.    CHIEF COMPLAINT;  Chief Complaint   Patient presents with     Cough       HISTORY OF PRESENT ILLNESS:  Ana Cristina is a 48 y.o. female presenting to the clinic today for evaluation of cough.     Cough: She endorses a cough that has been present for 5 days. Her symptoms started with a sore, scratchy throat. She then developed some chest discomfort. She has been unable to sleep because she has been coughing through the night. She describes a dry cough. She denies fevers or body aches. She has not had any recent sick contacts. She has not received the influenza immunization this year. She denies underlying asthma or allergies. She is not a smoker.     REVIEW OF SYSTEMS:  All other systems are negative.    PFSH:  Reviewed, as below.    TOBACCO USE:  History   Smoking Status     Never Smoker   Smokeless Tobacco     Never Used       VITALS:  Vitals:    10/15/18 1320   BP: 100/60   Pulse: (!) 56   Resp: 18   Temp: 98.7  F (37.1  C)   SpO2: 99%   Weight: 172 lb (78 kg)     Wt Readings from Last 3 Encounters:   10/15/18 172 lb (78 kg)   09/18/18 171 lb 14.4 oz (78  kg)   08/26/18 171 lb (77.6 kg)     Body mass index is 30.96 kg/(m^2).    PHYSICAL EXAM:  GENERAL APPEARANCE: Alert, cooperative, no distress, appears stated age  HEAD: Normocephalic, without obvious abnormality, atraumatic  EYES:  PERRL, conjunctiva/corneas clear, EOM's intact, fundi     benign, both eyes       EARS: Normal TM's and external ear canals, both ears  NOSE:  Nares normal, septum midline, mucosa normal, no drainage or sinus tenderness  THROAT: Lips, mucosa, and tongue normal; teeth and gums normal  NECK: Supple, symmetrical, trachea midline, no adenopathy;    thyroid:  No enlargement/tenderness/nodules  LUNGS: Clear to auscultation bilaterally, respirations unlabored  HEART: Regular rate and rhythm, S1 and S2 normal, no murmur, rub or gallop  NEUROLOGIC: CNII-XII intact.     RECENT RESULTS  No results found for this or any previous visit (from the past 48 hour(s)).      ADDITIONAL HISTORY SUMMARIZED (2): None.  DECISION TO OBTAIN EXTRA INFORMATION (1): None.  RADIOLOGY TESTS (1): None.  LABS (1): None.  MEDICINE TESTS (1): None.  INDEPENDENT REVIEW (2 each): None.    The visit lasted a total of 5 minutes face to face with the patient. Over 50% of the time was spent counseling and educating the patient about cough.    IHoma, am scribing for and in the presence of, Dr. Choudhary.    IDr. Choudhary, personally performed the services described in this documentation, as scribed by Homa Sanchez in my presence, and it is both accurate and complete.    Dragon dictation was used for this note.  Speech recognition errors are a possibility.    MEDICATIONS:  Current Outpatient Prescriptions   Medication Sig Dispense Refill     cyclobenzaprine (FLEXERIL) 5 MG tablet TAKE 1-2 TABLETS BY MOUTH THREE TIMES DAILY AS NEEDED FOR MUSCLE SPASM 30 tablet 0     ibuprofen (ADVIL,MOTRIN) 800 MG tablet Take 1 tablet (800 mg total) by mouth every 6 (six) hours as needed for pain. 30 tablet 1     multivitamin  therapeutic tablet Take 1 tablet by mouth daily.       codeine-guaiFENesin (GUAIFENESIN AC)  mg/5 mL liquid Take 5 mL by mouth 2 (two) times a day as needed for cough. 240 mL 0     No current facility-administered medications for this visit.        Total data points: 0

## 2021-06-21 NOTE — PROGRESS NOTES
Assessment/Plan:    1. Chronic midline low back pain without sciatica  Per further discussion with patient this is actually a chronic issue that has had acute worsening.  She does not have any red flag signs such as incontinence or saddle anesthesia; it sounds like she will intermittently have sciatic pain but is not having that currently.  Given symptoms have been present for such a long duration, it is reasonable to evaluate this further with MRI -ordered today.  Discussed that if MRI imaging is normal, we will plan referral to physical therapy.   - MR Lumbar Spine With Without Contrast; Future  - MR Thoracic Spine With Without Contrast; Future      Follow up: Depends on MRI results, return to clinic as needed for worsening of symptoms    Herlinda Zarate MD  [facility]    Subjective:    Patient ID: Ana Cristina Mc is a 48 y.o. female is here today for f/u back pain    Back pain  -Patient was seen on 18 at [facility] with NP for acute midline low back pain with right-sided sciatica -patient was given Medrol Dosepak and referred to physical therapy (pt but states she hasn't done this because she never got a phone call to schedule)  -didn't think that the methylprednisone was helpful  -felt better a few weeks ago after that last visit and then it started again  -significant pain with sitting, and then if trying to stand up straight  -no numbness/tingling  -no incontinence or saddle anesthesia  -sometimes feels more pain if holds bladder too long  -has tried tylenol and ibuprofen but hasn't been too helpful  -no dysuria or hematuria  -no fevers/chills, headache or vision changes  -had a little nausea  -had an epidural with her  for her last pregnancy, 7 yrs ago - this was the start of her issue, worse now      Patient Active Problem List   Diagnosis     Hyperlipidemia     Lower Back Pain     Cervical disc herniation     Calculus of gallbladder     Obesity     Past  "Medical History:   Diagnosis Date     Arthropathy Of The Shoulder Region     Created by Conversion      Hyperlipidemia     Created by Conversion      Past Surgical History:   Procedure Laterality Date      SECTION      x1       Current Outpatient Prescriptions on File Prior to Visit   Medication Sig Dispense Refill     cyclobenzaprine (FLEXERIL) 5 MG tablet TAKE 1-2 TABLETS BY MOUTH THREE TIMES DAILY AS NEEDED FOR MUSCLE SPASM 30 tablet 0     ibuprofen (ADVIL,MOTRIN) 800 MG tablet Take 1 tablet (800 mg total) by mouth every 6 (six) hours as needed for pain. 30 tablet 1     multivitamin therapeutic tablet Take 1 tablet by mouth daily.       [DISCONTINUED] codeine-guaiFENesin (GUAIFENESIN AC)  mg/5 mL liquid Take 5 mL by mouth 2 (two) times a day as needed for cough. 240 mL 0     No current facility-administered medications on file prior to visit.      No Known Allergies    Social History     Social History     Marital status:      Spouse name: N/A     Number of children: N/A     Years of education: N/A     Occupational History     Not on file.     Social History Main Topics     Smoking status: Never Smoker     Smokeless tobacco: Never Used     Alcohol use No     Drug use: No     Sexual activity: Not on file     Other Topics Concern     Not on file     Social History Narrative    Patient is right hand dominant.              Review of systems is as stated in HPI, and the remainder of system review is otherwise negative.    Objective:      /70 (Patient Site: Right Arm)  Pulse 65  Ht 5' 4\" (1.626 m)  Wt 177 lb 4 oz (80.4 kg)  BMI 30.42 kg/m2    General appearance: awake, NAD  HEENT: atraumatic, normocephalic, PERRL, EOMI, no scleral icterus or injection, ears and nose grossly normal, moist mucous membranes  Neck: normal ROM  CV: RRR, no murmurs/rubs/gallops, normal S1 and S2  Lungs: CTAB, no wheezes or crackles, breathing comfortably on room air  Back: no CVA tenderness, no tenderness with " palpation of spinal processes, no tenderness with palpation of paraspinal muscles, no significant muscle cramping on exam, normal side to side and flexion/extension range of motion  Extremities: no LE edema bilaterally, moving all extremities  Neuro: alert, oriented x3, CNs grossly intact, no focal deficits appreciated, normal tone/strength/ROM/sensation, gait normal  Psych: normal mood/affect/behavior, answering questions appropriately, linear thought process

## 2021-06-22 NOTE — PROGRESS NOTES
Assessment:     Diagnoses and all orders for this visit:    Chronic bilateral low back pain without sciatica  -     Ambulatory referral to PT/OT  -     nabumetone (RELAFEN) 500 MG tablet; Take 1 tablet (500 mg total) by mouth 3 (three) times a day as needed for pain.  Dispense: 28 tablet; Refill: 1    Bulge of lumbar disc without myelopathy  -     Ambulatory referral to PT/OT    Annular tear  -     Ambulatory referral to PT/OT    Lumbar facet arthropathy  -     Ambulatory referral to PT/OT  -     nabumetone (RELAFEN) 500 MG tablet; Take 1 tablet (500 mg total) by mouth 3 (three) times a day as needed for pain.  Dispense: 28 tablet; Refill: 1       Ana Cristina Mc is a 48 y.o. y.o. female with past medical history significant for vitamin D deficiency, reflux, fatigue, hyperlipidemia, obesity, cervical disc herniation who presents today for follow-up regarding:    -Ongoing chronic bilateral low back pain lumbosacral junction that is increased with forward flexion, mild increased pain with lumbar facet loading.  Patient does have a disc bulge and annular tear at L4-5 and L5-S1 which is likely a source of pain along with facet arthropathy.    Patient is neurologically intact on exam, no radicular symptoms.     Plan:     A shared decision making plan was used. The patient's values and choices were respected. Prior medical records from 4/29/2016 to current were reviewed today. The following represents what was discussed and decided upon by the provider and the patient.        -DIAGNOSTIC TESTS: Images were personally reviewed and interpreted.   --Lumbar spine MRI 11/20/2018 shows mild annular bulge with annular tear L4-5 and L5-S1, mild facet arthropathy at both levels but no central or foraminal stenosis.  --Thoracic spine MRI 11/20/2018 unremarkable.    -INTERVENTIONS: Did discuss trialing possible medial branch block versus bilateral L4-5 and L5-S1 facet joint steroid injection down the road if pain is not improving  with physical therapy.  Patient prefers to trial physical therapy medications first.    -MEDICATIONS: Prescribed nabumetone 500 mg 1 tablet 3 times daily as needed for pain and inflammation at this time.  Advised patient to take this with a full glass of water and food and to avoid over-the-counter ibuprofen, Advil, Aleve, naproxen while taking this medication.  Discussed side effects of medications and proper use. Patient verbalized understanding.    -PHYSICAL THERAPY: Referral to physical therapy [facility] and will Anamika sent today to establish home exercise core strengthening program.  Discussed the importance of core strengthening, ROM, stretching exercises with the patient and how each of these entities is important in decreasing pain.  Explained to the patient that the purpose of physical therapy is to teach the patient a home exercise program.  These exercises need to be performed every day in order to decrease pain and prevent future occurrences of pain.        -PATIENT EDUCATION:  40 minutes of total visit time was spent face to face with the patient today, 60 % of the visit was spent on counseling, education, and coordinating care.   -5 minutes spent outside of visit time, non-face-to-face time, reviewing chart.    -FOLLOW UP: Follow-up in 8 weeks, sooner pain is worsening or new symptoms arise.  Advised to contact clinic if symptoms worsen or change.    Subjective:     Ana Cristina Mc is a 48 y.o. female who presents today for follow-up regarding ongoing chronic bilateral low back pain midline at the belt line and lumbosacral junction since 2011 that is been progressive over the last 2 months that is worse with bending as well as sitting for long periods of time and worse first thing in the morning.  Patient denies any trauma in the past, denies recent trips or falls or balance changes, denies lower extremity pain, denies numbness or tingling sensations.  Patient denies bowel or bladder  dysfunction.    Patient's daughter was present today during entire visit and added to past medical/surgical/family/social history and history of presenting illness.     Treatment to Date: No prior spinal surgery.  Physical therapy [facility] x3 sessions for neck pain 3/29/2016 with relief    Medications:  Flexeril with minimal benefit  Ibuprofen and naproxen with minimal benefit    Patient Active Problem List   Diagnosis     Hyperlipidemia     Lower Back Pain     Cervical disc herniation     Calculus of gallbladder     Obesity       Current Outpatient Medications on File Prior to Encounter   Medication Sig Dispense Refill     cyclobenzaprine (FLEXERIL) 10 MG tablet as needed.  0     ibuprofen (ADVIL,MOTRIN) 800 MG tablet Take 1 tablet (800 mg total) by mouth every 6 (six) hours as needed for pain. 30 tablet 1     multivitamin therapeutic tablet Take 1 tablet by mouth daily.       No current facility-administered medications on file prior to encounter.        No Known Allergies    Past Medical History:   Diagnosis Date     Arthropathy Of The Shoulder Region     Created by Conversion      Hyperlipidemia     Created by Conversion         Review of Systems  ROS:  Specifically negative for bowel/bladder dysfunction, balance changes, headache, dizziness, foot drop, fevers, chills, appetite changes, nausea/vomiting, unexplained weight loss. Otherwise 13 systems reviewed are negative. Please see the patient's intake questionnaire from today for details.    Reviewed Social, Family, Past Medical and Past Surgical history with patient, no significant changes noted since prior visit.     Objective:     /56 (Patient Site: Right Arm, Patient Position: Sitting)   Pulse (!) 55   Wt 174 lb (78.9 kg)   SpO2 100%   BMI 29.87 kg/m      PHYSICAL EXAMINATION:    --CONSTITUTIONAL: Well developed, well nourished, healthy appearing individual.  --PSYCHIATRIC: Appropriate mood and affect. No difficulty interacting due to temper,  social withdrawal, or memory issues.  --SKIN: Lumbar region is dry and intact.   --RESPIRATORY: Normal rhythm and effort. No abnormal accessory muscle breathing patterns noted.   --MUSCULOSKELETAL:  Normal lumbar lordosis noted, no lateral shift.  --GROSS MOTOR: Easily arises from a seated position. Gait is non-antalgic  --LUMBAR SPINE:  Inspection reveals no evidence of deformity. Range of motion is moderately limited in lumbar flexion due to pain, mildly limited with extension with semi-tremulous bilateral lateral rotation.  Mild tenderness lumbar spine at L4-5 and L5-S1 levels. Straight leg raising in the supine position is negative to radicular pain. Sciatic notch non-tender.   --SACROILIAC JOINT: One Finger point test negative.  --LOWER EXTREMITY MOTOR TESTING:  Plantar flexion left 5/5, right 5/5   Dorsiflexion left 5/5, right 5/5   Great toe MTP extension left 5/5, right 5/5  Knee flexion left 5/5, right 5/5  Knee extension left 5/5, right 5/5   Hip flexion left 5/5, right 5/5  Hip abduction left 5/5, right 5/5  Hip adduction left 5/5, right 5/5   --HIPS: Full range of motion bilaterally.   --NEUROLOGIC: Bilateral patellar and achilles reflexes are physiologic and symmetric. Sensation to light touch is intact in the bilateral L4, L5, and S1 dermatomes.    RESULTS:   Imaging: Lumbar spine imaging was reviewed today. The images were shown to the patient and the findings were explained using a spine model.      Mr Thoracic Spine With Without Contrast  Mr Lumbar Spine With Without Contrast  Result Date: 2018  INDICATION: Chronic low back pain after epidural/ seven years ago. Low back pain >6 weeks, no red flag signs. COMPARISON: None. CONTRAST: Gadavist 8 cc. TECHNIQUE: Without and with IV contrast FINDINGS: THORACIC SPINE: Normal alignment and marrow pattern in the thoracic spine. Thoracic cord normal in appearance and caliber without evidence of abnormal signal or enhancement. Visualized  paraspinal soft tissues are normal. LUMBAR SPINE: Nomenclature is based on five lumbar-type vertebral bodies. Normal vertebral body heights, alignment and marrow signal. The conus tip is identified at L1. No extraspinal abnormality. The visualized portions of the bony pelvis are normal for age.   T12-L1: Normal disc height and signal. No herniation. No facet arthropathy. No spinal canal stenosis. No right neural foraminal stenosis. No left neural foraminal stenosis.   L1-L2: Normal disc height and signal. No herniation. Mild facet arthropathy. No spinal canal stenosis. No right neural foraminal stenosis. No left neural foraminal stenosis.   L2-L3: Normal disc height and signal. No herniation. Mild facet arthropathy. No spinal canal stenosis. No right neural foraminal stenosis. No left neural foraminal stenosis.   L3-L4: Mild loss of T2 disc signal with preservation of the interspace height. There is a mild broad-based annular bulge and mild facet arthropathy. No central spinal canal or neural foraminal stenosis.   L4-L5: Mild loss of T2 disc signal with preservation of interspace height. There is a small circumferential tear of the posterior annulus with a mild underlying broad-based annular bulge. Mild facet arthropathy. No central spinal canal or neural foraminal stenosis.   L5-S1: Mild loss of T2 disc signal with preservation of interspace height. Very mild broad-based annular bulge with tiny tear of the posterior, midline portion of the annulus. Mild facet arthropathy. No central spinal canal or neural foraminal stenosis.   CONCLUSION:   1.  Normal MRI of the thoracic spine.   2.  In the lumbar spine, there are mild annular bulges at L3-L4, L4-L5 and L5-S1 without resulting central spinal canal or neural foraminal stenosis.       Mr Cervical Spine Without Contrast  Result Date: 4/24/2016  INDICATION: Left upper extremity radicular pain TECHNIQUE: Performed without IV contrast. SEDATION: None. COMPARISON:  Cervical spine plain films 02/10/2016  FINDINGS: Normal vertebral body heights. Straightened lordosis. Normal marrow signal. Normal appearance of the craniocervical junction and of C1-C2. No abnormal cord signal. The visualized intracranial contents are grossly normal. Grossly normal  paraspinal soft tissues.    C2-C3: Normal disc height. No herniation. No facet arthropathy. No spinal canal stenosis. No right neural foraminal stenosis. No left neural foraminal stenosis.    C3-C4: Normal disc height. No herniation. No facet arthropathy. No spinal canal stenosis. No right neural foraminal stenosis. No left neural foraminal stenosis.    C4-C5: Normal disc height. No herniation. No facet arthropathy. No spinal canal stenosis. No right neural foraminal stenosis. No left neural foraminal stenosis.    C5-C6: Mild circumferential disc osteophyte complex. Moderate left foraminal disc extrusion. Mild bilateral facet arthropathy. No spinal canal stenosis. Mild right neural foraminal stenosis. Moderate proximal left neural foraminal stenosis.    C6-C7: Normal disc height. No herniation. No facet arthropathy. No spinal canal stenosis. No right neural foraminal stenosis. No left neural foraminal stenosis.    C7-T1: Normal disc height. No herniation. No facet arthropathy. No spinal canal stenosis. No right neural foraminal stenosis. No left neural foraminal stenosis.   CONCLUSION:   1.  Moderate left foraminal C5-C6 disc extrusion with moderate stenosis of the left foraminal inlet. Correlate for left C6 radicular pain.   2.  No other neural foraminal stenosis.   3.  No spinal canal stenosis.

## 2021-06-22 NOTE — PROGRESS NOTES
Assessment/Plan:    1. Fatigue, unspecified type  2. Dizziness  3. Vitamin D deficiency  4. Hx of iron deficiency anemia  Pt reports a hx of similar fatigue and dizziness in the past; at that time she had low vitamin D and anemia. She is worried that she may have these again. No hx thyroid disease. Heavy bleeding for first two days of period only. Will check labs as below.   - Hemoglobin  - Vitamin D, Total (25-Hydroxy)  - Thyroid Stimulating Hormone (TSH)  - Basic Metabolic Panel    Follow up: as needed    Herlinda Zarate MD  [facility]    Subjective:    Patient ID: Ana Cristina Mc is a 48 y.o. female is here today for fatigue    Fatigue   -has had issues with this in the past - low iron, vitamin D  -started a few months ago, each day is a little different  -no clear new life changes/stressors  -sleeping ok overall, thinks getting enough sleep  -LMP  - first two days are heavy and then lightens up  -occasional dizziness, feels that room spins sometimes - sometimes happens when standing or sitting but not always with position changes  -no chest pain, shortness of breath, palpitations      Patient Active Problem List   Diagnosis     Hyperlipidemia     Lower Back Pain     Cervical disc herniation     Calculus of gallbladder     Obesity     Past Medical History:   Diagnosis Date     Arthropathy Of The Shoulder Region     Created by Conversion      Hyperlipidemia     Created by Conversion      Past Surgical History:   Procedure Laterality Date      SECTION      x1     Current Outpatient Medications on File Prior to Visit   Medication Sig Dispense Refill     cyclobenzaprine (FLEXERIL) 10 MG tablet as needed.  0     ibuprofen (ADVIL,MOTRIN) 800 MG tablet Take 1 tablet (800 mg total) by mouth every 6 (six) hours as needed for pain. 30 tablet 1     multivitamin therapeutic tablet Take 1 tablet by mouth daily.       nabumetone (RELAFEN) 500 MG tablet Take 1 tablet (500 mg total) by mouth 3  (three) times a day as needed for pain. 28 tablet 1     No current facility-administered medications on file prior to visit.      No Known Allergies  Social History     Socioeconomic History     Marital status:      Spouse name: Not on file     Number of children: Not on file     Years of education: Not on file     Highest education level: Not on file   Social Needs     Financial resource strain: Not on file     Food insecurity - worry: Not on file     Food insecurity - inability: Not on file     Transportation needs - medical: Not on file     Transportation needs - non-medical: Not on file   Occupational History     Not on file   Tobacco Use     Smoking status: Never Smoker     Smokeless tobacco: Never Used   Substance and Sexual Activity     Alcohol use: No     Frequency: Never     Binge frequency: Never     Drug use: No     Sexual activity: Not on file   Other Topics Concern     Not on file   Social History Narrative    Patient is right hand dominant.              Review of systems is as stated in HPI, and the remainder of system review is otherwise negative.    Objective:      /82   Pulse (!) 52   Wt 174 lb (78.9 kg)   SpO2 100%   BMI 29.87 kg/m      General appearance: awake, NAD  HEENT: atraumatic, normocephalic, no scleral icterus or injection  Neck: no thyromegaly or nodules noted  CV: RRR, no murmurs/rubs/gallops, normal S1 and S2  Lungs: CTAB, no wheezes or crackles, breathing comfortably on room air  Extremities: no LE edema bilaterally, moving all extremities  Skin: no rashes or lesions  Neuro: alert, oriented x3, CNs grossly intact, no focal deficits appreciated  Psych: normal mood/affect/behavior, answering questions appropriately, linear thought process

## 2021-06-22 NOTE — PROGRESS NOTES
Assessment/Plan:    1. Encounter to discuss test results  2. Chronic midline low back pain without sciatica  She is here today to discuss recent back MRI results.  Discussed that there were no abnormalities found in her thoracic spine.  Discussed that there are mild annular disc bulges at multiple levels in the lumbar spine (L3-S1); there is no compression of the spinal cord or neural foraminal stenosis noted, no severe abnormalities noted.  Given that these disc bulges are mild, we discussed that these may be causing her symptoms though may not be.  Discussed options for management including: Referral to spine clinic versus starting physical therapy.  Patient prefers to be seen by spine specialist at this time.  Discussed using Tylenol and ibuprofen as needed for pain, would not increase pain medicine further at this time.  - Ambulatory referral to Spine Care      Follow up: In a few months (after spine care consultation)    Herlinda Zarate MD  [facility]    Subjective:    Patient ID: Ana Cristina Mc is a 48 y.o. female is here today to discuss recent MRI results    Recent MRI  Chronic back pain  -Patient was seen on 11/9/18 for this issue  -Patient reports pain in her middle and low back since her last pregnancy 7 years ago, intermittently worsens but never completely goes away  -No numbness/tingling, no saddle anesthesia, no incontinence  -Has been using Tylenol and ibuprofen as needed, these are occasionally helpful  -Tried Flexeril in the past but felt that this made her feel worse so stopped it  -Given duration of back pain, we had discussed obtaining MRI for further evaluation, patient had this done on 11/20, results as below  -No major changes in back pain since her last visit      EXAM: MR LUMBAR SPINE WITHOUT AND WITH CONTRAST, MR THORACIC SPINE WITHOUT AND WITH CONTRAST  LOCATION: [facility]  DATE/TIME: 11/20/2018, 9:46 PM    FINDINGS:   THORACIC SPINE:  Normal alignment and marrow  pattern in the thoracic spine. Thoracic cord normal in appearance and caliber without evidence of abnormal signal or enhancement. Visualized paraspinal soft tissues are normal.     LUMBAR SPINE:  Nomenclature is based on five lumbar-type vertebral bodies. Normal vertebral body heights, alignment and marrow signal. The conus tip is identified at L1. No extraspinal abnormality. The visualized portions of the bony pelvis are normal for age.     T12-L1: Normal disc height and signal. No herniation. No facet arthropathy. No spinal canal stenosis. No right neural foraminal stenosis. No left neural foraminal stenosis.     L1-L2: Normal disc height and signal. No herniation. Mild facet arthropathy. No spinal canal stenosis. No right neural foraminal stenosis. No left neural foraminal stenosis.     L2-L3: Normal disc height and signal. No herniation. Mild facet arthropathy. No spinal canal stenosis. No right neural foraminal stenosis. No left neural foraminal stenosis.     L3-L4: Mild loss of T2 disc signal with preservation of the interspace height. There is a mild broad-based annular bulge and mild facet arthropathy. No central spinal canal or neural foraminal stenosis.     L4-L5: Mild loss of T2 disc signal with preservation of interspace height. There is a small circumferential tear of the posterior annulus with a mild underlying broad-based annular bulge. Mild facet arthropathy. No central spinal canal or neural   foraminal stenosis.     L5-S1: Mild loss of T2 disc signal with preservation of interspace height. Very mild broad-based annular bulge with tiny tear of the posterior, midline portion of the annulus. Mild facet arthropathy. No central spinal canal or neural foraminal stenosis.     CONCLUSION:  1.  Normal MRI of the thoracic spine.  2.  In the lumbar spine, there are mild annular bulges at L3-L4, L4-L5 and L5-S1 without resulting central spinal canal or neural foraminal stenosis.    Patient Active Problem List    Diagnosis     Hyperlipidemia     Lower Back Pain     Cervical disc herniation     Calculus of gallbladder     Obesity     Past Medical History:   Diagnosis Date     Arthropathy Of The Shoulder Region     Created by Conversion      Hyperlipidemia     Created by Conversion      Past Surgical History:   Procedure Laterality Date      SECTION      x1     Current Outpatient Medications on File Prior to Visit   Medication Sig Dispense Refill     ibuprofen (ADVIL,MOTRIN) 800 MG tablet Take 1 tablet (800 mg total) by mouth every 6 (six) hours as needed for pain. 30 tablet 1     multivitamin therapeutic tablet Take 1 tablet by mouth daily.       [DISCONTINUED] cyclobenzaprine (FLEXERIL) 5 MG tablet TAKE 1-2 TABLETS BY MOUTH THREE TIMES DAILY AS NEEDED FOR MUSCLE SPASM 30 tablet 0     No current facility-administered medications on file prior to visit.      No Known Allergies     Social History     Socioeconomic History     Marital status:      Spouse name: Not on file     Number of children: Not on file     Years of education: Not on file     Highest education level: Not on file   Social Needs     Financial resource strain: Not on file     Food insecurity - worry: Not on file     Food insecurity - inability: Not on file     Transportation needs - medical: Not on file     Transportation needs - non-medical: Not on file   Occupational History     Not on file   Tobacco Use     Smoking status: Never Smoker     Smokeless tobacco: Never Used   Substance and Sexual Activity     Alcohol use: No     Frequency: Never     Binge frequency: Never     Drug use: No     Sexual activity: Not on file   Other Topics Concern     Not on file   Social History Narrative    Patient is right hand dominant.              Review of systems is as stated in HPI, and the remainder of system review is otherwise negative.    Objective:      BP 90/60   Pulse 63   Wt 173 lb 7 oz (78.7 kg)   BMI 29.77 kg/m      General appearance: awake,  NAD  HEENT: atraumatic, normocephalic, no scleral icterus or injection  Extremities: moving all extremities  Neuro: alert, oriented x3, CNs grossly intact, no focal deficits appreciated  Psych: normal mood/affect/behavior, answering questions appropriately, linear thought process

## 2021-07-03 NOTE — ADDENDUM NOTE
Addendum Note by Chidi Mccabe CMA at 12/17/2018  1:30 PM     Author: Chidi Mccabe CMA Service: -- Author Type: Certified Medical Assistant    Filed: 12/18/2018  2:15 PM Date of Service: 12/17/2018  1:30 PM Status: Signed    : Chidi Mccabe CMA ([facility])    Encounter addended by: Chidi Mccabe CMA on: 12/18/2018  2:15 PM      Actions taken: Visit Navigator Flowsheet section accepted

## 2021-07-03 NOTE — ADDENDUM NOTE
Addendum Note by Herlinda Zarate MD at 12/21/2018  1:20 PM     Author: Herlinda Zarate MD Service: -- Author Type: Physician    Filed: 12/26/2018  8:47 AM Encounter Date: 12/21/2018 Status: Signed    : Herlinda Zarate MD (Physician)    Addended by: HERLINDA ZARATE on: 12/26/2018 08:47 AM        Modules accepted: Orders

## 2021-08-09 ENCOUNTER — OFFICE VISIT (OUTPATIENT)
Dept: FAMILY MEDICINE | Facility: CLINIC | Age: 51
End: 2021-08-09
Payer: COMMERCIAL

## 2021-08-09 VITALS
SYSTOLIC BLOOD PRESSURE: 100 MMHG | HEIGHT: 63 IN | WEIGHT: 150.2 LBS | DIASTOLIC BLOOD PRESSURE: 60 MMHG | BODY MASS INDEX: 26.61 KG/M2 | HEART RATE: 66 BPM

## 2021-08-09 DIAGNOSIS — Z00.00 ENCOUNTER FOR ROUTINE HISTORY AND PHYSICAL EXAM IN FEMALE: ICD-10-CM

## 2021-08-09 DIAGNOSIS — Z12.11 COLON CANCER SCREENING: ICD-10-CM

## 2021-08-09 DIAGNOSIS — M54.2 NECK PAIN: ICD-10-CM

## 2021-08-09 DIAGNOSIS — E66.3 OVERWEIGHT: ICD-10-CM

## 2021-08-09 DIAGNOSIS — E55.9 VITAMIN D DEFICIENCY: Primary | ICD-10-CM

## 2021-08-09 DIAGNOSIS — Z13.1 DIABETES MELLITUS SCREENING: ICD-10-CM

## 2021-08-09 DIAGNOSIS — E78.2 MIXED HYPERLIPIDEMIA: ICD-10-CM

## 2021-08-09 DIAGNOSIS — Z12.31 ENCOUNTER FOR SCREENING MAMMOGRAM FOR BREAST CANCER: ICD-10-CM

## 2021-08-09 DIAGNOSIS — K21.9 GASTROESOPHAGEAL REFLUX DISEASE, UNSPECIFIED WHETHER ESOPHAGITIS PRESENT: ICD-10-CM

## 2021-08-09 PROCEDURE — 99214 OFFICE O/P EST MOD 30 MIN: CPT | Mod: 25 | Performed by: NURSE PRACTITIONER

## 2021-08-09 PROCEDURE — 99396 PREV VISIT EST AGE 40-64: CPT | Performed by: NURSE PRACTITIONER

## 2021-08-09 RX ORDER — MULTIVITAMIN
TABLET ORAL
COMMUNITY
End: 2024-04-30

## 2021-08-09 RX ORDER — CYCLOBENZAPRINE HCL 5 MG
TABLET ORAL
COMMUNITY
Start: 2021-05-04 | End: 2021-08-09

## 2021-08-09 RX ORDER — CYCLOBENZAPRINE HCL 5 MG
TABLET ORAL
Qty: 30 TABLET | Refills: 3 | Status: SHIPPED | OUTPATIENT
Start: 2021-08-09 | End: 2023-03-22

## 2021-08-09 ASSESSMENT — MIFFLIN-ST. JEOR: SCORE: 1262.49

## 2021-08-09 NOTE — PROGRESS NOTES
Assessment and Plan:    Encounter for routine history and physical exam in female  recommend consuming a healthy diet and exercising.  She is not fasting today.  She will follow-up for fasting labs.  She declines Covid vaccine.    Diabetes mellitus screening  - Glucose    Encounter for screening mammogram for breast cancer  - *MA Screening Digital Bilateral    Colon cancer screening  - LARISSA([employer])    Neck pain  Patient saw rheumatology who suspected PMR.  She does now want a temporal artery biopsy.  She continues cyclobenzaprine as needed.  I encourage her to follow-up with rheumatology.  - cyclobenzaprine (FLEXERIL) 5 MG tablet  Dispense: 30 tablet; Refill: 3    Mixed hyperlipidemia  We will check lipid cascade and notify patient of results.  She is not currently taking medication.  - Lipid panel reflex to direct LDL Fasting    Vitamin D deficiency  She is not currently taking vitamin D supplementation. Will notify patient of results.  - Vitamin D Deficiency    Gastroesophageal reflux disease, unspecified whether esophagitis present  She continues omeprazole.    Overweight  Discussed weight loss goals.       Subjective:     Ana Cristina is a 51 year old female presenting to the clinic for a female physical.     LMP: current, regular   Hx of abnormal pap smear: none  Last pap smear: 10/29/19  Perform self-breast exams: none   Vaginal discharge or irritation: none  Sexually active: yes,  for 26 years   Contraception: none   Concerns for STDs: none  Previous pregnancies:four pregnancies (1 miscarriage, 2 vaginal deliveries and 1 )     Answers for HPI/ROS submitted by the patient on 2021  Frequency of exercise:: 4-5 days/week  Getting at least 3 servings of Calcium per day:: Yes  Diet:: Regular (no restrictions)  Taking medications regularly:: Yes  Medication side effects:: None  Bi-annual eye exam:: Yes  Dental care twice a year:: Yes  Sleep apnea or symptoms of sleep apnea::  None  Additional concerns today:: No  Duration of exercise:: 30-45 minutes      Patient has had chronic headaches and neck pain.  She saw rheumatology who suspected PMR.  She was treated with oral steroids.  It was recommended for her to have a temporal artery biopsy, but she declined this.  She has been symptomatically treating with cyclobenzaprine and naproxen.  She has a history of vitamin D deficiency and is not currently taking vitamin D.  She takes omeprazole as needed for esophageal reflux.  She has a history of hyperlipidemia and is not currently taking medication.    Review of systems:  I performed a 10 point review of systems.  All pertinent positives and negatives are noted in the HPI. All others are negative.     No Known Allergies    Current Outpatient Medications   Medication     acetaminophen (TYLENOL) 500 MG tablet     Cholecalciferol (VITAMIN D3) 59889 units TABS     cyclobenzaprine (FLEXERIL) 5 MG tablet     ibuprofen (ADVIL,MOTRIN) 800 MG tablet     multivitamin, therapeutic (THERA-VIT) TABS tablet     omeprazole (PRILOSEC) 40 MG capsule     Specialty Vitamins Products (VITAMINS FOR HAIR) TABS     cyclobenzaprine (FLEXERIL) 10 MG tablet     naproxen (NAPROSYN) 500 MG tablet     omeprazole (PRILOSEC) 20 MG DR capsule     order for DME     No current facility-administered medications for this visit.       Social History     Socioeconomic History     Marital status:      Spouse name: Not on file     Number of children: Not on file     Years of education: Not on file     Highest education level: Not on file   Occupational History     Not on file   Tobacco Use     Smoking status: Never Smoker     Smokeless tobacco: Never Used   Substance and Sexual Activity     Alcohol use: No     Drug use: No     Sexual activity: Yes     Partners: Male     Birth control/protection: None   Other Topics Concern     Not on file   Social History Narrative    Patient is right hand dominant.      Social Determinants  of Health     Financial Resource Strain:      Difficulty of Paying Living Expenses:    Food Insecurity:      Worried About Running Out of Food in the Last Year:      Ran Out of Food in the Last Year:    Transportation Needs:      Lack of Transportation (Medical):      Lack of Transportation (Non-Medical):    Physical Activity:      Days of Exercise per Week:      Minutes of Exercise per Session:    Stress:      Feeling of Stress :    Social Connections:      Frequency of Communication with Friends and Family:      Frequency of Social Gatherings with Friends and Family:      Attends [employer] witness Services:      Active Member of Clubs or Organizations:      Attends Club or Organization Meetings:      Marital Status:    Intimate Partner Violence:      Fear of Current or Ex-Partner:      Emotionally Abused:      Physically Abused:      Sexually Abused:        Past Medical History:   Diagnosis Date     Calculus of gallbladder 5/20/2014    Overview:  Problem list name updated by automated process. Provider to review     Cervical disc herniation 4/29/2016     Esophageal reflux     Created by Conversion      Iron deficiency 10/8/2019     Migraine with aura and without status migrainosus, not intractable 10/8/2019     Mixed hyperlipidemia     Hyperlipidemia     Other and unspecified hyperlipidemia     Created by Conversion      Polymyalgia (H) 10/8/2019     Unspecified arthropathy, shoulder region     Created by Conversion      Vitamin D deficiency     Created by Conversion  Replacement Utility updated for latest IMO load       Family History   Family history unknown: Yes   Problem Relation Age of Onset     Family history unknown: Yes     No Known Problems Mother      No Known Problems Father      No Known Problems Sister      No Known Problems Brother      No Known Problems Brother      No Known Problems Brother      No Known Problems Brother      No Known Problems Brother        Past Surgical History:   Procedure Laterality  "Date      SECTION  2011    Procedure: SECTION; Surgeon:THUAN CAO; Location:[facility]      SECTION      x1     NO HISTORY OF SURGERY         Objective:     /60 (BP Location: Left arm, Patient Position: Sitting, Cuff Size: Adult Regular)   Pulse 66   Ht 1.588 m (5' 2.5\")   Wt 68.1 kg (150 lb 3.2 oz)   BMI 27.03 kg/m      Patient is alert, no obvious distress.   Skin: Warm, dry.  No rashes or lesions. Skin turgor rapid return.   HEENT:  Eyes normal.  Ears normal.  Nose patent, mucosa pink.  Oropharynx mucosa pink, no lesions or tonsil enlargement.   Neck:  Supple, without lymphadenopathy, bruits, JVD. Thyroid normal texture and size.    Lungs:  Clear to auscultation.  No wheezing, rales noted.  Respirations even and unlabored.   Heart:  Regular rate and rhythm.  No murmurs.   Breasts:  Normal.  No surrounding adenopathy.   Abdomen: Soft, nontender.  No organomegaly.  Bowel sounds normoactive.  No guarding or masses noted.   : deferred  Musculoskeletal:  Full ROM of extremities.  Muscle strength equal +5/5.   Neurological:  Cranial nerves 2-12 intact.                 "

## 2021-08-10 ENCOUNTER — LAB (OUTPATIENT)
Dept: LAB | Facility: CLINIC | Age: 51
End: 2021-08-10
Payer: COMMERCIAL

## 2021-08-10 DIAGNOSIS — E78.2 MIXED HYPERLIPIDEMIA: ICD-10-CM

## 2021-08-10 DIAGNOSIS — E55.9 VITAMIN D DEFICIENCY: ICD-10-CM

## 2021-08-10 DIAGNOSIS — Z13.1 DIABETES MELLITUS SCREENING: ICD-10-CM

## 2021-08-10 LAB
CHOLEST SERPL-MCNC: 246 MG/DL
FASTING STATUS PATIENT QL REPORTED: YES
FASTING STATUS PATIENT QL REPORTED: YES
GLUCOSE BLD-MCNC: 89 MG/DL (ref 70–125)
HDLC SERPL-MCNC: 60 MG/DL
LDLC SERPL CALC-MCNC: 166 MG/DL
TRIGL SERPL-MCNC: 99 MG/DL

## 2021-08-10 PROCEDURE — 80061 LIPID PANEL: CPT

## 2021-08-10 PROCEDURE — 36415 COLL VENOUS BLD VENIPUNCTURE: CPT

## 2021-08-10 PROCEDURE — 82306 VITAMIN D 25 HYDROXY: CPT

## 2021-08-10 PROCEDURE — 82947 ASSAY GLUCOSE BLOOD QUANT: CPT

## 2021-08-11 LAB — DEPRECATED CALCIDIOL+CALCIFEROL SERPL-MC: 24 UG/L (ref 30–80)

## 2022-02-15 ENCOUNTER — OFFICE VISIT (OUTPATIENT)
Dept: FAMILY MEDICINE | Facility: CLINIC | Age: 52
End: 2022-02-15
Payer: COMMERCIAL

## 2022-02-15 VITALS
BODY MASS INDEX: 27.5 KG/M2 | DIASTOLIC BLOOD PRESSURE: 68 MMHG | SYSTOLIC BLOOD PRESSURE: 110 MMHG | HEART RATE: 66 BPM | WEIGHT: 152.8 LBS

## 2022-02-15 DIAGNOSIS — R51.9 CHRONIC NONINTRACTABLE HEADACHE, UNSPECIFIED HEADACHE TYPE: ICD-10-CM

## 2022-02-15 DIAGNOSIS — M35.3 POLYMYALGIA (H): ICD-10-CM

## 2022-02-15 DIAGNOSIS — Z12.31 ENCOUNTER FOR SCREENING MAMMOGRAM FOR BREAST CANCER: ICD-10-CM

## 2022-02-15 DIAGNOSIS — R53.83 FATIGUE, UNSPECIFIED TYPE: Primary | ICD-10-CM

## 2022-02-15 DIAGNOSIS — G89.29 CHRONIC NONINTRACTABLE HEADACHE, UNSPECIFIED HEADACHE TYPE: ICD-10-CM

## 2022-02-15 LAB
ALBUMIN SERPL-MCNC: 3.8 G/DL (ref 3.5–5)
ALP SERPL-CCNC: 59 U/L (ref 45–120)
ALT SERPL W P-5'-P-CCNC: 10 U/L (ref 0–45)
ANION GAP SERPL CALCULATED.3IONS-SCNC: 9 MMOL/L (ref 5–18)
AST SERPL W P-5'-P-CCNC: 18 U/L (ref 0–40)
BILIRUB SERPL-MCNC: 0.9 MG/DL (ref 0–1)
BUN SERPL-MCNC: 6 MG/DL (ref 8–22)
CALCIUM SERPL-MCNC: 9.7 MG/DL (ref 8.5–10.5)
CHLORIDE BLD-SCNC: 105 MMOL/L (ref 98–107)
CO2 SERPL-SCNC: 27 MMOL/L (ref 22–31)
CREAT SERPL-MCNC: 0.69 MG/DL (ref 0.6–1.1)
ERYTHROCYTE [SEDIMENTATION RATE] IN BLOOD BY WESTERGREN METHOD: 9 MM/HR (ref 0–20)
GFR SERPL CREATININE-BSD FRML MDRD: >90 ML/MIN/1.73M2
GLUCOSE BLD-MCNC: 92 MG/DL (ref 70–125)
POTASSIUM BLD-SCNC: 4 MMOL/L (ref 3.5–5)
PROT SERPL-MCNC: 6.7 G/DL (ref 6–8)
SODIUM SERPL-SCNC: 141 MMOL/L (ref 136–145)
VIT B12 SERPL-MCNC: 357 PG/ML (ref 213–816)

## 2022-02-15 PROCEDURE — 99214 OFFICE O/P EST MOD 30 MIN: CPT | Performed by: NURSE PRACTITIONER

## 2022-02-15 PROCEDURE — 82306 VITAMIN D 25 HYDROXY: CPT | Performed by: NURSE PRACTITIONER

## 2022-02-15 PROCEDURE — 82607 VITAMIN B-12: CPT | Performed by: NURSE PRACTITIONER

## 2022-02-15 PROCEDURE — 80053 COMPREHEN METABOLIC PANEL: CPT | Performed by: NURSE PRACTITIONER

## 2022-02-15 PROCEDURE — 36415 COLL VENOUS BLD VENIPUNCTURE: CPT | Performed by: NURSE PRACTITIONER

## 2022-02-15 PROCEDURE — 85652 RBC SED RATE AUTOMATED: CPT | Performed by: NURSE PRACTITIONER

## 2022-02-15 NOTE — PROGRESS NOTES
Assessment and Plan:       ICD-10-CM    1. Fatigue, unspecified type  R53.83 Comprehensive metabolic panel     Vitamin D Deficiency     Vitamin B12     Erythrocyte sedimentation rate auto     Comprehensive metabolic panel     Vitamin D Deficiency     Vitamin B12     Erythrocyte sedimentation rate auto   2. Polymyalgia (H)  M35.3    3. Chronic nonintractable headache, unspecified headache type  R51.9     G89.29    4. Encounter for screening mammogram for breast cancer  Z12.31 *MA Screening Digital Bilateral     We will rule out vitamin D and vitamin B12 deficiency.  We will recheck sed rate as she has a history of elevation in the past.  Differentials include fibromyalgia, polymyalgia rheumatica, polymyositis, viral illness.  There are also concerns regarding temporal arteritis in the past.  Offered referral back to rheumatology, but she declines.  She believes this is likely due to vitamin deficiency.  Discussed symptomatic treatment with over-the-counter acetaminophen or ibuprofen as needed.  She is content with the plan.    Subjective:     Ana Cristina is a 52 year old female presenting to the clinic for concerns for ongoing fatigue.  Patient has been experiencing fatigue and generalized body aches for 1 month.  She is sleeping well at night.  She does not snore.  She has not noticed any redness or swelling of her joints.  She has had symptoms intermittently for multiple years.  She has seen a rheumatologist where no underlying autoimmune disease was noted.  There was concerns regarding temporal arteritis.  She did find some relief with prednisone treatment.  She was not interested in temporal biopsy.  She continues to experience intermittent headaches.  She was evaluated at [facility] yesterday for similar symptoms.  Lab work showed unremarkable hemogram, TSH, CRP, CK.  She has a history of Covid diagnosed in January.  She does have a history of vitamin D deficiency and is not currently taking  supplementation.    Reviewof Systems: A complete 14 point review of systems was obtained and is negative or as stated in the history of present illness.    Social History     Socioeconomic History     Marital status:      Spouse name: Not on file     Number of children: Not on file     Years of education: Not on file     Highest education level: Not on file   Occupational History     Not on file   Tobacco Use     Smoking status: Never Smoker     Smokeless tobacco: Never Used   Substance and Sexual Activity     Alcohol use: No     Drug use: No     Sexual activity: Yes     Partners: Male     Birth control/protection: None   Other Topics Concern     Not on file   Social History Narrative    Patient is right hand dominant.      Social Determinants of Health     Financial Resource Strain: Not on file   Food Insecurity: Not on file   Transportation Needs: Not on file   Physical Activity: Not on file   Stress: Not on file   Social Connections: Not on file   Intimate Partner Violence: Not on file   Housing Stability: Not on file       Active Ambulatory Problems     Diagnosis Date Noted     Mixed hyperlipidemia      Irregular menses 06/13/2008     Rotator cuff (capsule) sprain 09/14/2010     Pain in joint, shoulder region 09/14/2010     Health Care Home 09/04/2012     Esophageal reflux 09/04/2012     Ganglion, joint 09/04/2012     Hyperlipidemia LDL goal <100 09/04/2012     Gastritis and gastroduodenitis 09/04/2012     Insomnia 09/04/2012     Lower back pain 09/04/2012     Obesity 09/04/2012     Tinnitus 09/04/2012     Vitamin D deficiency 09/04/2012     Calculus of gallbladder 05/20/2014     Resolved Ambulatory Problems     Diagnosis Date Noted     Pain in joint, shoulder region 03/18/2009     LBP 09/04/2013     Past Medical History:   Diagnosis Date     Cervical disc herniation 4/29/2016     Iron deficiency 10/8/2019     Migraine with aura and without status migrainosus, not intractable 10/8/2019     Other and  unspecified hyperlipidemia      Polymyalgia (H) 10/8/2019     Unspecified arthropathy, shoulder region        Family History   Family history unknown: Yes   Problem Relation Age of Onset     Family history unknown: Yes     No Known Problems Mother      No Known Problems Father      No Known Problems Sister      No Known Problems Brother      No Known Problems Brother      No Known Problems Brother      No Known Problems Brother      No Known Problems Brother        Objective:     /68 (BP Location: Right arm, Patient Position: Sitting, Cuff Size: Adult Regular)   Pulse 66   Wt 69.3 kg (152 lb 12.8 oz)   BMI 27.50 kg/m      Patient is alert, in no obvious distress.   Skin: Warm, dry.  No lesions or rashes.  Skin turgor rapid return.   HEENT:  Head normocephalic, atraumatic.  Eyes normal. Ears normal.  Nose patent, mucosa pink.  Oropharynx mucosa pink.  No lesions or tonsillar enlargement.   Neck: Supple, no lymphadenopathy.   Lungs:  Clear to auscultation. Respirations even and unlabored.  No wheezing or rales noted.   Heart:  Regular rate and rhythm.  No murmurs, S3, S4, gallops, or rubs.    Musculoskeletal:  Full ROM of extremities.

## 2022-02-16 LAB — DEPRECATED CALCIDIOL+CALCIFEROL SERPL-MC: 36 UG/L (ref 30–80)

## 2022-04-17 ENCOUNTER — HEALTH MAINTENANCE LETTER (OUTPATIENT)
Age: 52
End: 2022-04-17

## 2022-09-18 ENCOUNTER — HOSPITAL ENCOUNTER (EMERGENCY)
Facility: CLINIC | Age: 52
Discharge: HOME | End: 2022-09-18
Admitting: PHYSICIAN ASSISTANT
Payer: COMMERCIAL

## 2022-09-18 ENCOUNTER — APPOINTMENT (OUTPATIENT)
Dept: ULTRASOUND IMAGING | Facility: CLINIC | Age: 52
End: 2022-09-18
Payer: COMMERCIAL

## 2022-09-18 VITALS
HEART RATE: 58 BPM | DIASTOLIC BLOOD PRESSURE: 70 MMHG | SYSTOLIC BLOOD PRESSURE: 120 MMHG | HEIGHT: 64 IN | WEIGHT: 149 LBS | RESPIRATION RATE: 16 BRPM | TEMPERATURE: 98.6 F | OXYGEN SATURATION: 98 % | BODY MASS INDEX: 25.44 KG/M2

## 2022-09-18 DIAGNOSIS — S86.811A STRAIN OF CALF MUSCLE, RIGHT, INITIAL ENCOUNTER: ICD-10-CM

## 2022-09-18 PROCEDURE — 99284 EMERGENCY DEPT VISIT MOD MDM: CPT | Mod: 25

## 2022-09-18 PROCEDURE — 93971 EXTREMITY STUDY: CPT | Mod: RT

## 2022-09-18 RX ORDER — IBUPROFEN 600 MG/1
600 TABLET, FILM COATED ORAL ONCE
Status: DISCONTINUED | OUTPATIENT
Start: 2022-09-18 | End: 2022-09-18 | Stop reason: HOSPADM

## 2022-09-18 ASSESSMENT — ENCOUNTER SYMPTOMS
CHILLS: 0
VOMITING: 0
WOUND: 0
DIARRHEA: 0
COLOR CHANGE: 0
WEAKNESS: 0
BRUISES/BLEEDS EASILY: 0
FEVER: 0
SHORTNESS OF BREATH: 0
NUMBNESS: 0

## 2022-09-18 NOTE — ED TRIAGE NOTES
The patient presents to the ED with c/o right lower anterior leg pain since Wednesday. Pain worsens with standing or walking. Denies any injuries. Took Tylenol last night without relief.     Triage Assessment     Row Name 09/18/22 1139       Triage Assessment (Adult)    Airway WDL WDL       Respiratory WDL    Respiratory WDL WDL       Skin Circulation/Temperature WDL    Skin Circulation/Temperature WDL WDL       Cardiac WDL    Cardiac WDL WDL       Peripheral/Neurovascular WDL    Peripheral Neurovascular WDL WDL       Cognitive/Neuro/Behavioral WDL    Cognitive/Neuro/Behavioral WDL WDL

## 2022-09-18 NOTE — DISCHARGE INSTRUCTIONS
No blood clot noted on the ultrasound.  I suspect this is likely a muscle strain.  I recommend Tylenol 1000 mg, ibuprofen 600 mg every 8 hours.  You can alternate ice and heat.  Elevate whenever resting.  If no improvement in 1 week follow-up with your primary care provider.  If you develop any new or worsening symptoms including significant swelling, redness, fevers, increasing pain, weakness, numbness return to the emergency department.

## 2022-09-18 NOTE — Clinical Note
Ana Cristina Mc was seen and treated in our emergency department on 9/18/2022.  She may return to work on 09/21/2022.       If you have any questions or concerns, please don't hesitate to call.      Debbie Kim PA-C

## 2022-09-18 NOTE — ED PROVIDER NOTES
EMERGENCY DEPARTMENT ENCOUNTER      NAME: Ana Cristina Mc  AGE: 52 year old female  YOB: 1970  MRN: [id number]  EVALUATION DATE & TIME: 9/18/2022 11:41 AM    PCP: Jian Reddy    ED PROVIDER: Debbie Kim PA-C      Chief Complaint   Patient presents with     Leg Pain     Right lower leg         FINAL IMPRESSION:  1. Strain of calf muscle, right, initial encounter          MEDICAL DECISION MAKING:    Pertinent Labs & Imaging studies reviewed. (See chart for details)  52 year old female presents to the Emergency Department for evaluation of atraumatic right lower leg pain x 4 days. No history of VTE. No exogenous hormone use or recent travel. She notes occasionally her right lower leg looks more swollen and warm. Denies chest pain, shortness of breath, fevers, chills.     Vitals reviewed and unremarkable. Patient is well appearing and in no acute distress. On physical exam she has mild point tenderness to palpation of right anterolateral distal lower leg. No palpable cords. No overlying skin changes or warmth. No tenderness to palpation of ankle or over the achilles tendon. Able to planar and dorsiflex foot without difficulty. No calf swelling. Distal pulses 2+ and symmetric. Distal sensation intact. Differential diagnosisincludes but not limited to DVT, muscle strain, ligamentous injury, PVD.     No injury to suggest fracture or dislocation. She has no problems ambulating. Strong pedal pulse and good profusion with low suspicion for arterial compromise. Venous US negative for DVT or underlying fluid collection. No overlying skin changes or systemic signs of illness to suggest infectious etiology. Compartments are soft. Unsure of exact etiology however low suspicion for limb threatening process. Recommend tylenol, ibuprofen, lidocaine patches, ice/heat. Discussed return precautions and importance of follow up with PCP if no improvement. Patient expresses understanding and discharged home in stable  condition.     0 minutes of critical care time     ED COURSE:  11:41 AM I met with the patient, obtained history, performed an initial exam, and discussed options and plan for diagnostics and treatment here in the ED.  12:48 PM Patient discharged after being provided with extensive anticipatory guidance and given return precautions, importance of PCP follow-up emphasized.    At the conclusion of the encounter I discussed the results of all of the tests and the disposition. The questions were answered. The patient acknowledged understanding and was agreeable with the care plan.     MEDICATIONS GIVEN IN THE EMERGENCY:  Medications   ibuprofen (ADVIL/MOTRIN) tablet 600 mg (600 mg Oral Not Given 9/18/22 1256)       NEW PRESCRIPTIONS STARTED AT TODAY'S ER VISIT  Discharge Medication List as of 9/18/2022 12:51 PM               =================================================================    HPI:    Patient information was obtained from: Patient    Use of Interpretor: N/A      Ana Cristina Mc is a 52 year old female with a pertinent history of HLD, GERD, low back pain who presents to this ED via private vehicle for evaluation of leg pain.     Patient reporting right anterior lower leg pain onset 4 days ago.  She denies any injury.  She reports slight swelling and warmth intermittently in the right lower leg.  No overlying redness.  Pain is worse when she is walking or standing.  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  She denies any history of blood clots.  She is not on any exogenous hormones. No recent travel or surgeries. She denies any chest pain, shortness of breath, fevers, chills, leg weakness or numbness.  She has been using Tylenol and ibuprofen for pain.  Ibuprofen seems to provide some relief.      REVIEW OF SYSTEMS:  Review of Systems   Constitutional: Negative for chills and fever.   Respiratory: Negative for shortness of breath.    Cardiovascular:  Positive for leg swelling (right lower leg, intermittent). Negative for chest pain.   Gastrointestinal: Negative for diarrhea and vomiting.   Musculoskeletal:        Right lower leg pain   Skin: Negative for color change, rash and wound.   Allergic/Immunologic: Negative for immunocompromised state.   Neurological: Negative for weakness and numbness.   Hematological: Does not bruise/bleed easily.   All other systems reviewed and are negative.      PAST MEDICAL HISTORY:  Past Medical History:   Diagnosis Date     Calculus of gallbladder 2014    Overview:  Problem list name updated by automated process. Provider to review     Cervical disc herniation 2016     Esophageal reflux     Created by Conversion      Iron deficiency 10/8/2019     Migraine with aura and without status migrainosus, not intractable 10/8/2019     Mixed hyperlipidemia     Hyperlipidemia     Other and unspecified hyperlipidemia     Created by Conversion      Polymyalgia (H) 10/8/2019     Unspecified arthropathy, shoulder region     Created by Conversion      Vitamin D deficiency     Created by Conversion  Replacement Utility updated for latest IMO load       PAST SURGICAL HISTORY:  Past Surgical History:   Procedure Laterality Date      SECTION  2011    Procedure: SECTION; Surgeon:THUAN CAO; Location:[facility]      SECTION      x1     NO HISTORY OF SURGERY             CURRENT MEDICATIONS:      Current Facility-Administered Medications:      ibuprofen (ADVIL/MOTRIN) tablet 600 mg, 600 mg, Oral, Once, Debbie Kim PA-C    Current Outpatient Medications:      acetaminophen (TYLENOL) 500 MG tablet, Take 2 tablets (1,000 mg) by mouth every 6 hours as needed for pain, Disp: 100 tablet, Rfl: 1     cyclobenzaprine (FLEXERIL) 5 MG tablet, TAKE 1 TO 2 TABLETS(5 TO 10 MG) BY MOUTH THREE TIMES DAILY AS NEEDED FOR MUSCLE SPASMS, Disp: 30 tablet, Rfl: 3     multivitamin, therapeutic (THERA-VIT) TABS tablet, Take 1  "tablet by mouth daily, Disp: 100 tablet, Rfl: 3     omeprazole (PRILOSEC) 40 MG capsule, Take 1 capsule (40 mg) by mouth daily Take 30-60 minutes before a meal. No further refills until seen in clinic., Disp: 30 capsule, Rfl: 0     Specialty Vitamins Products (VITAMINS FOR HAIR) TABS, , Disp: , Rfl:       ALLERGIES:  No Known Allergies    FAMILY HISTORY:  Family History   Family history unknown: Yes   Problem Relation Age of Onset     Family history unknown: Yes     No Known Problems Mother      No Known Problems Father      No Known Problems Sister      No Known Problems Brother      No Known Problems Brother      No Known Problems Brother      No Known Problems Brother      No Known Problems Brother        SOCIAL HISTORY:   Social History     Socioeconomic History     Marital status:    Tobacco Use     Smoking status: Never Smoker     Smokeless tobacco: Never Used   Substance and Sexual Activity     Alcohol use: No     Drug use: No     Sexual activity: Yes     Partners: Male     Birth control/protection: None   Social History Narrative    Patient is right hand dominant.        VITALS:  Patient Vitals for the past 24 hrs:   BP Temp Temp src Pulse Resp SpO2 Height Weight   09/18/22 1230 120/70 -- -- -- 16 98 % -- --   09/18/22 1138 122/78 98.6  F (37  C) Oral 58 16 99 % 1.626 m (5' 4\") 67.6 kg (149 lb)       PHYSICAL EXAM  Constitutional: Well developed, Well nourished, NAD  HENT: Normocephalic, Atraumatic  Neck: Supple, No stridor.  Eyes: Conjunctiva normal, No discharge.   Respiratory: Normal breath sounds, No respiratory distress, No wheezing, Speaks full sentences easily. No cough.  Cardiovascular: Normal heart rate, Regular rhythm, No murmurs, No rubs, No gallops. Chest wall nontender.  GI: non-distended.  Musculoskeletal: 2+ DP pulses. No edema. No cyanosis, No clubbing. Good range of motion in all major joints. Mild reproducible tenderness to right anterolateral lower leg. No overlying skin changes. No " palpable cord. Full ROM of right ankle without difficulty. Distal sensation intact. No tenderness of the CTLS spine.   Integument: Warm, Dry, No erythema, No rash. No petechiae.  Neurologic: Alert & oriented x 3, Normal motor function, Normal sensory function, No focal deficits noted. Normal gait.  Psychiatric: Affect normal, Judgment normal, Mood normal. Cooperative.      RADIOLOGY:  Reviewed all pertinent imaging. Please see official radiology report.  US Lower Extremity Venous Duplex Right   Final Result   IMPRESSION:   1.  No deep venous thrombosis in the right lower extremity.          Debbie Kim PA-C  Emergency Medicine  [facility]  9/18/2022     Debbie Kim PA-C  09/18/22 [id number]

## 2022-09-30 ENCOUNTER — TELEPHONE (OUTPATIENT)
Dept: FAMILY MEDICINE | Facility: CLINIC | Age: 52
End: 2022-09-30

## 2022-09-30 DIAGNOSIS — K21.9 GASTROESOPHAGEAL REFLUX DISEASE WITHOUT ESOPHAGITIS: ICD-10-CM

## 2022-09-30 RX ORDER — OMEPRAZOLE 40 MG/1
40 CAPSULE, DELAYED RELEASE ORAL DAILY
Qty: 90 CAPSULE | Refills: 3 | Status: SHIPPED | OUTPATIENT
Start: 2022-09-30 | End: 2023-03-22

## 2022-09-30 NOTE — TELEPHONE ENCOUNTER
Patient requesting to speak with Daphney Conde's nurse.  Patient is requesting to get a prescription for omeprazole.  Last had it filled in 2017.    Please call when done or if you have any questions.  OK to TATY on .    Patient uses [facility] on [address], in [place]

## 2022-10-23 ENCOUNTER — HEALTH MAINTENANCE LETTER (OUTPATIENT)
Age: 52
End: 2022-10-23

## 2023-03-21 PROBLEM — E61.1 IRON DEFICIENCY: Status: ACTIVE | Noted: 2019-10-08

## 2023-03-21 PROBLEM — G43.109 MIGRAINE WITH AURA AND WITHOUT STATUS MIGRAINOSUS, NOT INTRACTABLE: Status: ACTIVE | Noted: 2019-10-08

## 2023-03-21 PROBLEM — R70.0 ELEVATED SED RATE: Status: ACTIVE | Noted: 2019-10-08

## 2023-03-21 PROBLEM — D72.819 LEUKOPENIA, UNSPECIFIED TYPE: Status: ACTIVE | Noted: 2019-10-08

## 2023-03-21 PROBLEM — M35.3 POLYMYALGIA (H): Status: ACTIVE | Noted: 2019-10-08

## 2023-03-22 ENCOUNTER — OFFICE VISIT (OUTPATIENT)
Dept: FAMILY MEDICINE | Facility: CLINIC | Age: 53
End: 2023-03-22
Payer: COMMERCIAL

## 2023-03-22 VITALS
TEMPERATURE: 97.5 F | DIASTOLIC BLOOD PRESSURE: 78 MMHG | WEIGHT: 160.6 LBS | HEART RATE: 73 BPM | OXYGEN SATURATION: 99 % | SYSTOLIC BLOOD PRESSURE: 120 MMHG | BODY MASS INDEX: 29.55 KG/M2 | RESPIRATION RATE: 16 BRPM | HEIGHT: 62 IN

## 2023-03-22 DIAGNOSIS — Z12.11 SCREEN FOR COLON CANCER: ICD-10-CM

## 2023-03-22 DIAGNOSIS — M35.3 POLYMYALGIA (H): ICD-10-CM

## 2023-03-22 DIAGNOSIS — D72.819 LEUKOPENIA, UNSPECIFIED TYPE: ICD-10-CM

## 2023-03-22 DIAGNOSIS — E55.9 VITAMIN D DEFICIENCY: ICD-10-CM

## 2023-03-22 DIAGNOSIS — Z12.31 VISIT FOR SCREENING MAMMOGRAM: ICD-10-CM

## 2023-03-22 DIAGNOSIS — E78.2 MIXED HYPERLIPIDEMIA: ICD-10-CM

## 2023-03-22 DIAGNOSIS — Z00.00 ROUTINE GENERAL MEDICAL EXAMINATION AT A HEALTH CARE FACILITY: Primary | ICD-10-CM

## 2023-03-22 DIAGNOSIS — E61.1 IRON DEFICIENCY: ICD-10-CM

## 2023-03-22 DIAGNOSIS — R53.82 CHRONIC FATIGUE: ICD-10-CM

## 2023-03-22 LAB
ALBUMIN SERPL BCG-MCNC: 4.2 G/DL (ref 3.5–5.2)
ALP SERPL-CCNC: 74 U/L (ref 35–104)
ALT SERPL W P-5'-P-CCNC: 17 U/L (ref 10–35)
ANION GAP SERPL CALCULATED.3IONS-SCNC: 12 MMOL/L (ref 7–15)
AST SERPL W P-5'-P-CCNC: 30 U/L (ref 10–35)
BILIRUB SERPL-MCNC: 0.8 MG/DL
BUN SERPL-MCNC: 8.7 MG/DL (ref 6–20)
CALCIUM SERPL-MCNC: 9.6 MG/DL (ref 8.6–10)
CHLORIDE SERPL-SCNC: 104 MMOL/L (ref 98–107)
CHOLEST SERPL-MCNC: 217 MG/DL
CREAT SERPL-MCNC: 0.51 MG/DL (ref 0.51–0.95)
DEPRECATED CALCIDIOL+CALCIFEROL SERPL-MC: 25 UG/L (ref 20–75)
DEPRECATED HCO3 PLAS-SCNC: 25 MMOL/L (ref 22–29)
ERYTHROCYTE [DISTWIDTH] IN BLOOD BY AUTOMATED COUNT: 12 % (ref 10–15)
GFR SERPL CREATININE-BSD FRML MDRD: >90 ML/MIN/1.73M2
GLUCOSE SERPL-MCNC: 84 MG/DL (ref 70–99)
HCT VFR BLD AUTO: 39.5 % (ref 35–47)
HDLC SERPL-MCNC: 80 MG/DL
HGB BLD-MCNC: 13.3 G/DL (ref 11.7–15.7)
IRON BINDING CAPACITY (ROCHE): 308 UG/DL (ref 240–430)
IRON SATN MFR SERPL: 33 % (ref 15–46)
IRON SERPL-MCNC: 101 UG/DL (ref 37–145)
LDLC SERPL CALC-MCNC: 128 MG/DL
MCH RBC QN AUTO: 31.1 PG (ref 26.5–33)
MCHC RBC AUTO-ENTMCNC: 33.7 G/DL (ref 31.5–36.5)
MCV RBC AUTO: 92 FL (ref 78–100)
NONHDLC SERPL-MCNC: 137 MG/DL
PLATELET # BLD AUTO: 188 10E3/UL (ref 150–450)
POTASSIUM SERPL-SCNC: 3.7 MMOL/L (ref 3.4–5.3)
PROT SERPL-MCNC: 7.1 G/DL (ref 6.4–8.3)
RBC # BLD AUTO: 4.28 10E6/UL (ref 3.8–5.2)
SODIUM SERPL-SCNC: 141 MMOL/L (ref 136–145)
TRIGL SERPL-MCNC: 46 MG/DL
TSH SERPL DL<=0.005 MIU/L-ACNC: 0.77 UIU/ML (ref 0.3–4.2)
VIT B12 SERPL-MCNC: 372 PG/ML (ref 232–1245)
WBC # BLD AUTO: 3.5 10E3/UL (ref 4–11)

## 2023-03-22 PROCEDURE — 90471 IMMUNIZATION ADMIN: CPT | Performed by: FAMILY MEDICINE

## 2023-03-22 PROCEDURE — 85027 COMPLETE CBC AUTOMATED: CPT | Performed by: FAMILY MEDICINE

## 2023-03-22 PROCEDURE — 90750 HZV VACC RECOMBINANT IM: CPT | Performed by: FAMILY MEDICINE

## 2023-03-22 PROCEDURE — 82607 VITAMIN B-12: CPT | Performed by: FAMILY MEDICINE

## 2023-03-22 PROCEDURE — 83550 IRON BINDING TEST: CPT | Performed by: FAMILY MEDICINE

## 2023-03-22 PROCEDURE — 99396 PREV VISIT EST AGE 40-64: CPT | Mod: 25 | Performed by: FAMILY MEDICINE

## 2023-03-22 PROCEDURE — 83540 ASSAY OF IRON: CPT | Performed by: FAMILY MEDICINE

## 2023-03-22 PROCEDURE — 36415 COLL VENOUS BLD VENIPUNCTURE: CPT | Performed by: FAMILY MEDICINE

## 2023-03-22 PROCEDURE — 99213 OFFICE O/P EST LOW 20 MIN: CPT | Mod: 25 | Performed by: FAMILY MEDICINE

## 2023-03-22 PROCEDURE — 80053 COMPREHEN METABOLIC PANEL: CPT | Performed by: FAMILY MEDICINE

## 2023-03-22 PROCEDURE — 82306 VITAMIN D 25 HYDROXY: CPT | Performed by: FAMILY MEDICINE

## 2023-03-22 PROCEDURE — 84443 ASSAY THYROID STIM HORMONE: CPT | Performed by: FAMILY MEDICINE

## 2023-03-22 PROCEDURE — 80061 LIPID PANEL: CPT | Performed by: FAMILY MEDICINE

## 2023-03-22 ASSESSMENT — ENCOUNTER SYMPTOMS
CONSTIPATION: 0
ABDOMINAL PAIN: 0
JOINT SWELLING: 0
WEAKNESS: 1
FEVER: 0
DIARRHEA: 0
PARESTHESIAS: 0
BREAST MASS: 0
DIZZINESS: 0
CHILLS: 0
SHORTNESS OF BREATH: 0
NAUSEA: 0
DYSURIA: 0
NERVOUS/ANXIOUS: 0
SORE THROAT: 0
MYALGIAS: 0
COUGH: 0
HEMATOCHEZIA: 0
HEMATURIA: 0
HEADACHES: 0
FREQUENCY: 0
EYE PAIN: 0
ARTHRALGIAS: 0
PALPITATIONS: 0
HEARTBURN: 0

## 2023-03-22 ASSESSMENT — PAIN SCALES - GENERAL: PAINLEVEL: NO PAIN (0)

## 2023-03-22 NOTE — PATIENT INSTRUCTIONS
You can call [phone] to schedule your mammogram at your convenience.      Preventive Health Recommendations  Female Ages 50 - 64    Yearly exam: See your health care provider every year in order to  Review health changes.   Discuss preventive care.    Review your medicines if your doctor has prescribed any.    Get a Pap test every three years (unless you have an abnormal result and your provider advises testing more often).  If you get Pap tests with HPV test, you only need to test every 5 years, unless you have an abnormal result.   You do not need a Pap test if your uterus was removed (hysterectomy) and you have not had cancer.  You should be tested each year for STDs (sexually transmitted diseases) if you're at risk.   Have a mammogram every 1 to 2 years.  Have a colonoscopy at age 50, or have a yearly FIT test (stool test). These exams screen for colon cancer.    Have a cholesterol test every 5 years, or more often if advised.  Have a diabetes test (fasting glucose) every three years. If you are at risk for diabetes, you should have this test more often.   If you are at risk for osteoporosis (brittle bone disease), think about having a bone density scan (DEXA).    Shots: Get a flu shot each year. Get a tetanus shot every 10 years.    Nutrition:   Eat at least 5 servings of fruits and vegetables each day.  Eat whole-grain bread, whole-wheat pasta and brown rice instead of white grains and rice.  Get adequate Calcium and Vitamin D.     Lifestyle  Exercise at least 150 minutes a week (30 minutes a day, 5 days a week). This will help you control your weight and prevent disease.  Limit alcohol to one drink per day.  No smoking.   Wear sunscreen to prevent skin cancer.   See your dentist every six months for an exam and cleaning.  See your eye doctor every 1 to 2 years.

## 2023-03-22 NOTE — PROGRESS NOTES
SUBJECTIVE:   CC: Ana Cristina is an 53 year old who presents for preventive health visit.     Additional Questions 3/22/2023   Roomed by Tomasa     Patient has been advised of split billing requirements and indicates understanding: Yes     Healthy Habits:     Getting at least 3 servings of Calcium per day:  Yes    Bi-annual eye exam:  Yes    Dental care twice a year:  Yes    Sleep apnea or symptoms of sleep apnea:  None    Diet:  Other    Frequency of exercise:  2-3 days/week    Duration of exercise:  45-60 minutes    Taking medications regularly:  Yes    Medication side effects:  None    PHQ-2 Total Score: 0    Additional concerns today:  No    PROBLEMS TO ADD ON...  1. Can feel weak over her whole body.  Feels that she needs to eat or drink, but has eaten and drank normally.  Not particularly lightheaded or dizzy.  Feels fatigued, but thinks she sleeps well.  Has seen rheumatology in the past and per previous notes did benefit some from steroids.  There was a concern at 1 point for temporal arteritis.  She does not describe headaches as a prominent feature currently.  2. Having some hot flashes, every day for 2 weeks.    Today's PHQ-2 Score:   PHQ-2 ( 1999 [employer]) 3/22/2023   Q1: Little interest or pleasure in doing things 0   Q2: Feeling down, depressed or hopeless 0   PHQ-2 Score 0   PHQ-2 Total Score (12-17 Years)- Positive if 3 or more points; Administer PHQ-A if positive -   Q1: Little interest or pleasure in doing things Not at all   Q2: Feeling down, depressed or hopeless Not at all   PHQ-2 Score 0       Have you ever done Advance Care Planning? (For example, a Health Directive, POLST, or a discussion with a medical provider or your loved ones about your wishes): No, advance care planning information given to patient to review.  Patient plans to discuss their wishes with loved ones or provider.      Social History     Tobacco Use     Smoking status: Never     Smokeless tobacco: Never   Substance Use Topics      Alcohol use: No         Alcohol Use 3/22/2023   Prescreen: >3 drinks/day or >7 drinks/week? No     Reviewed orders with patient.  Reviewed health maintenance and updated orders accordingly - Yes    BP Readings from Last 3 Encounters:   23 120/78   22 120/70   02/15/22 110/68    Wt Readings from Last 3 Encounters:   23 72.8 kg (160 lb 9.6 oz)   22 67.6 kg (149 lb)   02/15/22 69.3 kg (152 lb 12.8 oz)                  Patient Active Problem List   Diagnosis     Mixed hyperlipidemia     Irregular menses     Rotator cuff (capsule) sprain     Pain in joint, shoulder region     Health Care Home     Esophageal reflux     Ganglion, joint     Hyperlipidemia LDL goal <100     Gastritis and gastroduodenitis     Insomnia     Lower back pain     Obesity     Tinnitus     Vitamin D deficiency     Calculus of gallbladder     Elevated sed rate     Iron deficiency     Leukopenia, unspecified type     Migraine with aura and without status migrainosus, not intractable     Polymyalgia (H)     Cervical disc herniation     Past Surgical History:   Procedure Laterality Date      SECTION  2011    Procedure: SECTION; Surgeon:THUAN CAO; Location:[facility]      SECTION      x1     NO HISTORY OF SURGERY         Social History     Tobacco Use     Smoking status: Never     Smokeless tobacco: Never   Substance Use Topics     Alcohol use: No     Family History   Family history unknown: Yes   Problem Relation Age of Onset     Family history unknown: Yes     No Known Problems Mother      No Known Problems Father      No Known Problems Sister      No Known Problems Brother      No Known Problems Brother      No Known Problems Brother      No Known Problems Brother      No Known Problems Brother          Current Outpatient Medications   Medication Sig Dispense Refill     acetaminophen (TYLENOL) 500 MG tablet Take 2 tablets (1,000 mg) by mouth every 6 hours as needed for pain 100 tablet 1      multivitamin, therapeutic (THERA-VIT) TABS tablet Take 1 tablet by mouth daily 100 tablet 3     Specialty Vitamins Products (VITAMINS FOR HAIR) TABS        No Known Allergies    Breast Cancer Screening:    Breast CA Risk Assessment (FHS-7) 3/22/2023   Do you have a family history of breast, colon, or ovarian cancer? No / Unknown         Mammogram Screening: Recommended annual mammography  Pertinent mammograms are reviewed under the imaging tab.    History of abnormal Pap smear: NO - age 30-65 PAP every 5 years with negative HPV co-testing recommended  PAP / HPV 10/29/2019 12/29/2014 11/15/2010   PAP (Historical) - NIL NIL   HPV See Scanned Report - -     Reviewed and updated as needed this visit by clinical staff   Tobacco  Allergies  Meds  Problems             Reviewed and updated as needed this visit by Provider   Tobacco  Allergies  Meds  Problems  Med Hx  Surg Hx  Fam Hx  Soc   Hx         Review of Systems   Constitutional: Negative for chills and fever.   HENT: Negative for congestion, ear pain, hearing loss and sore throat.    Eyes: Negative for pain and visual disturbance.   Respiratory: Negative for cough and shortness of breath.    Cardiovascular: Negative for chest pain, palpitations and peripheral edema.   Gastrointestinal: Negative for abdominal pain, constipation, diarrhea, heartburn, hematochezia and nausea.   Breasts:  Negative for tenderness, breast mass and discharge.   Genitourinary: Negative for dysuria, frequency, genital sores, hematuria, pelvic pain, urgency, vaginal bleeding and vaginal discharge.   Musculoskeletal: Negative for arthralgias, joint swelling and myalgias.   Skin: Negative for rash.   Neurological: Positive for weakness. Negative for dizziness, headaches and paresthesias.   Psychiatric/Behavioral: Negative for mood changes. The patient is not nervous/anxious.         OBJECTIVE:   /78 (BP Location: Left arm, Patient Position: Sitting, Cuff Size: Adult Regular)    "Pulse 73   Temp 97.5  F (36.4  C) (Temporal)   Resp 16   Ht 1.582 m (5' 2.28\")   Wt 72.8 kg (160 lb 9.6 oz)   LMP 03/22/2023 (Approximate)   SpO2 99%   BMI 29.11 kg/m    Physical Exam  GENERAL: healthy, alert and no distress  EYES: Eyes grossly normal to inspection, PERRL and conjunctivae and sclerae normal  HENT: ear canals and TM's normal, nose and mouth without ulcers or lesions  NECK: no adenopathy, no asymmetry, masses, or scars and thyroid normal to palpation  RESP: lungs clear to auscultation - no rales, rhonchi or wheezes  BREAST: normal without masses, tenderness or nipple discharge and no palpable axillary masses or adenopathy  CV: regular rate and rhythm, normal S1 S2, no S3 or S4, no murmur, click or rub, no peripheral edema and peripheral pulses strong  ABDOMEN: soft, nontender, no hepatosplenomegaly, no masses and bowel sounds normal  MS: no gross musculoskeletal defects noted, no edema  SKIN: no suspicious lesions or rashes  NEURO: Normal strength and tone, mentation intact and speech normal  PSYCH: mentation appears normal, affect normal/bright    Diagnostic Test Results:  Labs reviewed in Epic  Pending at time of note    ASSESSMENT/PLAN:   1. Routine general medical examination at a health care facility  Routine history and physical, updated in EMR.  Mammogram ordered today along with colon cancer screening.  Fasting labs updated as below.  Immunizations are up-to-date with the exception of COVID-19, patient continues to decline this.  Pap smear is up-to-date, will be due in 2024.  Plan repeat physical in 1 year.  - Lipid panel reflex to direct LDL Fasting; Future    2. Polymyalgia (H)  Previously followed with rheumatology.  No longer describes polymyalgia per se, more generalized weakness and fatigue.  She would like vitamin levels checked along with iron levels.  Lab work-up will be pursued as below.  - Vitamin D Deficiency; Future  - Comprehensive metabolic panel (BMP + Alb, Alk Phos, " ALT, AST, Total. Bili, TP); Future  - CBC with platelets; Future  - TSH with free T4 reflex; Future    3. Mixed hyperlipidemia  Patient takes no medications for this.  Fasting lipid profile will be updated today, pending at time of note.  - Lipid panel reflex to direct LDL Fasting; Future  - Comprehensive metabolic panel (BMP + Alb, Alk Phos, ALT, AST, Total. Bili, TP); Future  - TSH with free T4 reflex; Future    4. Vitamin D deficiency  We will screen for vitamin D deficiency.  She does not currently take a supplement.  She prefers tablet form if she needs supplementation.  - Vitamin D Deficiency; Future    5. Iron deficiency  Patient states that she still has regular menses.  She is currently taking a multivitamin.  Recheck iron levels will be done today.  - CBC with platelets; Future  - Iron and iron binding capacity; Future    6. Leukopenia, unspecified type  This is a historical diagnosis.  Plan recheck CBC today.  - CBC with platelets; Future    7. Chronic fatigue  Likely related to lack of sleep.  Discussed the need to get 7 to 8 hours of sleep every day.  Discussed some strategies around this today.  Further work-up will be undertaken as below.  - Vitamin D Deficiency; Future  - Comprehensive metabolic panel (BMP + Alb, Alk Phos, ALT, AST, Total. Bili, TP); Future  - CBC with platelets; Future  - TSH with free T4 reflex; Future    8. Screen for colon cancer  After discussion of screening options, patient wishes to try Cologuard.  This was ordered today.  - COLOGUARD([employer]); Future    9. Visit for screening mammogram  Patient agrees to schedule routine screening mammogram.  Order given today.  - MA SCREENING DIGITAL BILAT - Future  (s+30); Future      Patient has been advised of split billing requirements and indicates understanding: Yes      COUNSELING:  Reviewed preventive health counseling, as reflected in patient instructions  Special attention given to:        Regular exercise        "Immunizations    Vaccinated for: Zoster         Colorectal Cancer Screening       (Tammi)menopause management      BMI:   Estimated body mass index is 29.11 kg/m  as calculated from the following:    Height as of this encounter: 1.582 m (5' 2.28\").    Weight as of this encounter: 72.8 kg (160 lb 9.6 oz).   Weight management plan: Discussed healthy diet and exercise guidelines      She reports that she has never smoked. She has never used smokeless tobacco.      Eunice Luciano MD  [facility]  "

## 2023-03-23 ENCOUNTER — LAB (OUTPATIENT)
Dept: FAMILY MEDICINE | Facility: CLINIC | Age: 53
End: 2023-03-23
Payer: COMMERCIAL

## 2023-03-23 DIAGNOSIS — Z12.11 SCREEN FOR COLON CANCER: ICD-10-CM

## 2023-06-01 ENCOUNTER — HEALTH MAINTENANCE LETTER (OUTPATIENT)
Age: 53
End: 2023-06-01

## 2024-03-26 NOTE — TELEPHONE ENCOUNTER
Abigail Li is a 26 y.o.female who presents with   Chief Complaint   Patient presents with    Medication Check   Portions of this chart may have been created with voice recognition software. Occasional wrong-word or \"sound-alike\" substitutions may have occurred due to the inherent limitations of voice recognition software. Read the chart carefully and recognize, using context, where these substitutions have occurred      Patient presents for follow up.    States she has been making progress with her weight since she has been making some changes in diet and increasing walking.     Patient today would also like to discuss depression. States that she has \"seasonal affective disorder\" but noticed that she was not feeling better since the weather has been improving and the sun has been out. Denies SI HI AVH.        Review of Systems   Constitutional:  Negative for fatigue.   Eyes:  Negative for visual disturbance.   Respiratory:  Negative for shortness of breath.    Cardiovascular:  Negative for chest pain.   Gastrointestinal:  Negative for abdominal pain.   Skin:  Negative for rash.   Neurological:  Negative for dizziness, light-headedness and headaches.   Psychiatric/Behavioral:  Positive for dysphoric mood.         History reviewed. No pertinent past medical history.    Past Surgical History:   Procedure Laterality Date    CHOLECYSTECTOMY         Social History     Socioeconomic History    Marital status: Single     Spouse name: Not on file    Number of children: Not on file    Years of education: Not on file    Highest education level: Not on file   Occupational History    Not on file   Tobacco Use    Smoking status: Never    Smokeless tobacco: Never   Substance and Sexual Activity    Alcohol use: Yes     Alcohol/week: 1.0 standard drink of alcohol     Types: 1 Glasses of wine per week    Drug use: Never    Sexual activity: Yes     Partners: Male   Other Topics Concern    Not on file   Social History Narrative    Not  I sent a prescription to the pharmacy.  Thanks.

## 2024-04-30 ENCOUNTER — OFFICE VISIT (OUTPATIENT)
Dept: FAMILY MEDICINE | Facility: CLINIC | Age: 54
End: 2024-04-30
Payer: COMMERCIAL

## 2024-04-30 VITALS
OXYGEN SATURATION: 98 % | HEART RATE: 66 BPM | SYSTOLIC BLOOD PRESSURE: 110 MMHG | DIASTOLIC BLOOD PRESSURE: 62 MMHG | HEIGHT: 63 IN | BODY MASS INDEX: 29.54 KG/M2 | WEIGHT: 166.7 LBS | RESPIRATION RATE: 16 BRPM | TEMPERATURE: 98 F

## 2024-04-30 DIAGNOSIS — M79.672 LEFT FOOT PAIN: Primary | ICD-10-CM

## 2024-04-30 DIAGNOSIS — L84 CALLUS OF FOOT: ICD-10-CM

## 2024-04-30 PROCEDURE — 99213 OFFICE O/P EST LOW 20 MIN: CPT

## 2024-04-30 ASSESSMENT — PAIN SCALES - GENERAL: PAINLEVEL: SEVERE PAIN (6)

## 2024-04-30 NOTE — PROGRESS NOTES
"  Assessment & Plan     Left foot pain  Foot pain is most likely consistent with plantar fasciitis.  Provided education on stretches for plantar fasciitis.  Wear supportive athletic shoes.  May use heel shoe inserts such as Dr. Acuña.  Perform ice massage (frozen tennis ball or frozen water bottle) and calf stretches.  Recheck if not starting to improve or difficulty bearing weight.      Callus of foot  1.5-1 inch callus of planter surface left lateral midfoot.  Callus may be contributing to left foot pain.  Referral to podiatry for removal.    BMI  Estimated body mass index is 30 kg/m  as calculated from the following:    Height as of this encounter: 1.588 m (5' 2.5\").    Weight as of this encounter: 75.6 kg (166 lb 11.2 oz).   Weight management plan: Discussed healthy diet and exercise guidelines    Roque De La Paz is a 54 year old, presenting for the following health issues:  Foot Pain (Patient having pain in both feet x 1 month.)      4/30/2024     7:46 AM   Additional Questions   Roomed by Estela MAHAN CMA     HPI     Patient is a 54 year old female presenting to the clinic for bilateral foot pain.  Medical history includes polymyalgias, vitamin D deficiency, anemia, hyperlipidemia, insomnia.    Report one month of bottom foot pain, L>R.  Pain starts at the heel and moves up plantar surface of foot.  Describes pain mostly as dull.  When standing for long period of time, pain is more sharp.  Pain is worse at night and with prolonged standing and prolonged sitting.  Works 8-10 hour shifts and stands most of her shift.  Has been taking tylenol and ibuprofen with little relief.  Denies any injury or trauma. No history of diabetes or neuropathy.    Review of Systems  Review of systems negative otherwise known HPI.    Past Medical History:   Diagnosis Date    Calculus of gallbladder 5/20/2014    Overview:  Problem list name updated by automated process. Provider to review    Cervical disc herniation 4/29/2016    " "Esophageal reflux     Created by Conversion     Gastritis and gastroduodenitis 2012    History of gastritis due to H. pylori Problem list name updated by automated process. Provider to review    Iron deficiency 10/8/2019    Migraine with aura and without status migrainosus, not intractable 10/8/2019    Mixed hyperlipidemia     Hyperlipidemia    Other and unspecified hyperlipidemia     Created by Conversion     Polymyalgia (H24) 10/8/2019    Unspecified arthropathy, shoulder region     Created by Conversion     Vitamin D deficiency     Created by Conversion  Replacement Utility updated for latest IMO load     Past Surgical History:   Procedure Laterality Date     SECTION  2011    Procedure: SECTION; Surgeon:THUAN CAO; Location:[facility]     Family History   Problem Relation Age of Onset    No Known Problems Mother     No Known Problems Father     No Known Problems Sister     No Known Problems Brother     No Known Problems Brother     No Known Problems Brother     No Known Problems Brother     No Known Problems Brother     Breast Cancer No family hx of     Colon Cancer No family hx of     Diabetes No family hx of     Coronary Artery Disease No family hx of      Social History     Tobacco Use    Smoking status: Never    Smokeless tobacco: Never   Substance Use Topics    Alcohol use: No     Current Outpatient Medications   Medication Sig Dispense Refill    acetaminophen (TYLENOL) 500 MG tablet Take 2 tablets (1,000 mg) by mouth every 6 hours as needed for pain 100 tablet 1     No current facility-administered medications for this visit.       Objective    /62 (BP Location: Left arm, Patient Position: Sitting, Cuff Size: Adult Regular)   Pulse 66   Temp 98  F (36.7  C) (Temporal)   Resp 16   Ht 1.588 m (5' 2.5\")   Wt 75.6 kg (166 lb 11.2 oz)   LMP 01/15/2024 (Approximate)   SpO2 98%   BMI 30.00 kg/m    Body mass index is 30 kg/m .  Physical Exam   General: Alert, oriented, no " acute distress.    Respiratory: Easy work of breathing.   Cardiovascular: Appears warm and well-perfused.    Skin: No abnormalities noted on visualized skin.   Neurologic: Mentation intact and speech normal.     Psychiatric: Appropriate affect.   Right and Left foot exam:   Inspection:  no swelling, ecchymosis, negative calcaneal squeeze test, 1.5-1 inch callus of planter surface left lateral midfoot.  Tender: plantar fascia  Non-tender: proximal 5th metatarsal, navicular  Range of Motion: flexion of toes full, extension of toes full    Signed Electronically by: SIMEON Cardenas CNP

## 2024-04-30 NOTE — PATIENT INSTRUCTIONS
Stretch bottom of foot in the AM before standing using a towel to dorsiflex your foot (pull toes up toward head).  Wear supportive athletic shoes. May use heel shoe inserts such as Dr. Acuña.   Perform ice massage (frozen tennis ball or frozen water bottle) and calf stretches.   May use braces for nighttime to keep foot flexed at 90 degrees (found on [employer]) if not starting to improve.   Recheck if not starting to improve or difficulty bearing weight.    Podiatry referral if not starting to improve with above measures 7 to 10 days, sooner if much worse.      Take ibuprofen for lower back pain. Use heat application for lower back 20 minutes at a time.

## 2024-05-07 ENCOUNTER — HOSPITAL ENCOUNTER (EMERGENCY)
Facility: CLINIC | Age: 54
Discharge: HOME | End: 2024-05-07
Admitting: PHYSICIAN ASSISTANT
Payer: COMMERCIAL

## 2024-05-07 VITALS
HEART RATE: 56 BPM | WEIGHT: 160 LBS | OXYGEN SATURATION: 96 % | HEIGHT: 62 IN | DIASTOLIC BLOOD PRESSURE: 81 MMHG | BODY MASS INDEX: 29.44 KG/M2 | RESPIRATION RATE: 16 BRPM | TEMPERATURE: 97.1 F | SYSTOLIC BLOOD PRESSURE: 127 MMHG

## 2024-05-07 DIAGNOSIS — M72.2 PLANTAR FASCIITIS: ICD-10-CM

## 2024-05-07 DIAGNOSIS — M79.672 LEFT FOOT PAIN: ICD-10-CM

## 2024-05-07 PROCEDURE — 99282 EMERGENCY DEPT VISIT SF MDM: CPT

## 2024-05-07 ASSESSMENT — COLUMBIA-SUICIDE SEVERITY RATING SCALE - C-SSRS
6. HAVE YOU EVER DONE ANYTHING, STARTED TO DO ANYTHING, OR PREPARED TO DO ANYTHING TO END YOUR LIFE?: NO
1. IN THE PAST MONTH, HAVE YOU WISHED YOU WERE DEAD OR WISHED YOU COULD GO TO SLEEP AND NOT WAKE UP?: NO
2. HAVE YOU ACTUALLY HAD ANY THOUGHTS OF KILLING YOURSELF IN THE PAST MONTH?: NO

## 2024-05-07 NOTE — ED TRIAGE NOTES
Pt c/o left foot pain for 1 month states started in heel and now goes up entire leg, does stand for long  hours at work, denies injury. Nothing for pain today

## 2024-05-07 NOTE — DISCHARGE INSTRUCTIONS
You were seen here in the emergency department for evaluation of left-sided foot pain.  Your examination here is consistent with plantar fasciitis, use ibuprofen or Tylenol, dose recommendations included below, additionally have included some stretches, the number for [facility] is included above, you can follow-up with them if you feel the need to.  Additionally, I would seek out some heel inserts for your shoes as this may improve your symptoms.  Follow-up with your primary care doctor as needed.    For pain or fever you may use:  -Tylenol 650 mg every 6 hours.  Max 4000 mg in 24 hours  Do not use thismedication with alcohol as it can cause liver problems.  -Ibuprofen 600 mg every 6 hours.  Max 3500 mg in 24 hours  Do not take this medication if you have a history of a GI bleed or have kidney problems.  You may use both of these medications at the same time or you can alternate them every 3 hours.  For example, Tylenol at 6 AM, ibuprofen at 9 AM, Tylenol at noon, etc.  -Plantar fasciitis night splint from [employer] or [facility] [employer]

## 2024-05-07 NOTE — ED PROVIDER NOTES
EMERGENCY DEPARTMENT ENCOUNTER      NAME: Ana Cristina Mc  AGE: 54 year old female  YOB: 1970  MRN: [id number]  EVALUATION DATE & TIME: No admission date for patient encounter.    PCP: Jian Reddy    ED PROVIDER: Carlos Bernard PA-C      Chief Complaint   Patient presents with    Foot Pain    Leg Pain         FINAL IMPRESSION:  1. Left foot pain    2. Plantar fasciitis          ED COURSE & MEDICAL DECISION MAKING:    Pertinent Labs & Imaging studies reviewed. (See chart for details)  12:10 PM CONG Hannah saw the patient patient and performed my initial interview and exam.   12:19 PM I saw the patient and discussed plan for discharge.     54 year old female presents to the Emergency Department for evaluation of left foot pain.     ED Course as of 05/07/24 1222   Tue May 07, 2024   1221 Patient is a 54-year-old female, past medical history of hyperlipidemia, presents emergency department for evaluation of acute on chronic left-sided foot pain.  Patient reportedly seen by her primary care on 04/30/2024, they discussed plantar fasciitis at the time, provided her some stretches, recommended medications, and follow-up with podiatry.  Patient with ongoing symptoms, does wear different shoes for work, notices that these make her symptoms worse.  Suggested inserts here.  Otherwise examination here grossly unremarkable, she does not have any calf tenderness, pulses intact, tenderness over the base of the foot, consistent with plantars fasciitis.  Recommend stretches, ibuprofen and Tylenol consistently at home.  Otherwise no indication for any further imaging, laboratory studies, certainly no evidence of infectious etiology.  Patient will be discharged at this time, recommend outpatient primary care follow-up.       Medical Decision Making  Obtained supplemental history:Supplemental history obtained?: No  Reviewed external records: External records reviewed?: Outpatient Record: Outpatient office  visit on 04/30/2024  Care impacted by chronic illness:Hyperlipidemia  Care significantly affected by social determinants of health:N/A  Did you consider but not order tests?: Work up considered but not performed and documented in chart, if applicable  Did you interpret images independently?: Independent interpretation of ECG and images noted in documentation, when applicable.  Consultation discussion with other provider:Did you involve another provider (consultant, , pharmacy, etc.)?: No  Discharge. No recommendations on prescription strength medication(s). N/A.         At the conclusion of the encounter I discussed the results of all of the tests and the disposition. The questions were answered. The patient or family acknowledged understanding and was agreeable with the care plan.       0 minutes of critical care time     MEDICATIONS GIVEN IN THE EMERGENCY:  Medications - No data to display    NEW PRESCRIPTIONS STARTED AT TODAY'S ER VISIT  New Prescriptions    No medications on file          =================================================================    HPI    Patient information was obtained from: Patient     Use of : N/A        Ana Cristina Mc is a 54 year old female with a pertinent history of hyperlipidemia, low back pain, insomnia, ganglion in the joint, polymyalgia, who presents to this ED for evaluation of left-sided foot pain.  Patient reports 1 month of left-sided foot pain.  Does a lot of standing at work, wear safety shoes.  No injury.  Has not take anything for pain today.  Has been using ibuprofen and Tylenol intermittently.  Saw her primary care on 04/30/2024, recommended to follow-up with podiatry, and provided some stretches.  Patient otherwise denies any recent trauma or complaints.    PAST MEDICAL HISTORY:  Past Medical History:   Diagnosis Date    Calculus of gallbladder 5/20/2014    Overview:  Problem list name updated by automated process. Provider to review    Cervical disc  herniation 2016    Esophageal reflux     Created by Conversion     Gastritis and gastroduodenitis 2012    History of gastritis due to H. pylori Problem list name updated by automated process. Provider to review    Iron deficiency 10/8/2019    Migraine with aura and without status migrainosus, not intractable 10/8/2019    Mixed hyperlipidemia     Hyperlipidemia    Other and unspecified hyperlipidemia     Created by Conversion     Polymyalgia (H24) 10/8/2019    Unspecified arthropathy, shoulder region     Created by Conversion     Vitamin D deficiency     Created by Conversion  Replacement Utility updated for latest IMO load       PAST SURGICAL HISTORY:  Past Surgical History:   Procedure Laterality Date     SECTION  2011    Procedure: SECTION; Surgeon:THUAN CAO Location:[facility]           CURRENT MEDICATIONS:    acetaminophen (TYLENOL) 500 MG tablet         ALLERGIES:  No Known Allergies    FAMILY HISTORY:  Family History   Problem Relation Age of Onset    No Known Problems Mother     No Known Problems Father     No Known Problems Sister     No Known Problems Brother     No Known Problems Brother     No Known Problems Brother     No Known Problems Brother     No Known Problems Brother     Breast Cancer No family hx of     Colon Cancer No family hx of     Diabetes No family hx of     Coronary Artery Disease No family hx of        SOCIAL HISTORY:   Social History     Socioeconomic History    Marital status:    Tobacco Use    Smoking status: Never    Smokeless tobacco: Never   Vaping Use    Vaping status: Never Used   Substance and Sexual Activity    Alcohol use: No    Drug use: No    Sexual activity: Not Currently     Partners: Male     Birth control/protection: None   Social History Narrative    Patient is right hand dominant.      Social Determinants of Health     Interpersonal Safety: Low Risk  (2024)    Interpersonal Safety     Do you feel physically and emotionally  "safe where you currently live?: Yes     Within the past 12 months, have you been hit, slapped, kicked or otherwise physically hurt by someone?: No     Within the past 12 months, have you been humiliated or emotionally abused in other ways by your partner or ex-partner?: No       VITALS:  /71   Pulse 66   Temp 97.1  F (36.2  C) (Temporal)   Resp 12   Ht 1.575 m (5' 2\")   Wt 72.6 kg (160 lb)   LMP 04/05/2024 (Approximate)   SpO2 98%   BMI 29.26 kg/m      PHYSICAL EXAM    Physical Exam  Vitals and nursing note reviewed.   Constitutional:       General: She is not in acute distress.     Appearance: Normal appearance. She is normal weight. She is not toxic-appearing or diaphoretic.   HENT:      Right Ear: External ear normal.      Left Ear: External ear normal.   Eyes:      Conjunctiva/sclera: Conjunctivae normal.   Cardiovascular:      Rate and Rhythm: Normal rate.      Pulses: Normal pulses.   Pulmonary:      Effort: Pulmonary effort is normal.   Musculoskeletal:         General: Tenderness present. No swelling, deformity or signs of injury.      Right lower leg: No edema.      Left lower leg: No edema.      Comments: Patient with tenderness over the base of the foot, primarily just anterior to the heel, without rebound or guarding, no masses, lesions, or redness.  Pulses intact.   Skin:     Findings: No erythema or rash.   Neurological:      Mental Status: She is alert. Mental status is at baseline.           LAB:  All pertinent labs reviewed and interpreted.  Labs Ordered and Resulted from Time of ED Arrival to Time of ED Departure - No data to display    RADIOLOGY:  Reviewed all pertinent imaging. Please see official radiology report.  No orders to display     PROCEDURES:   None.     Carlos Bernard PA-C  Emergency Medicine  [facility] EMERGENCY ROOM  [address] [facility] [facility] [address] [address] [phone]  Dept: [phone]     "   Carlos Bernard PA-C  05/07/24 8330

## 2024-05-07 NOTE — Clinical Note
Ana Cristina Mc was seen and treated in our emergency department on 5/7/2024.  She may return to work on 05/07/2024.  Patient was seen in the ED for plantar fasciitis. Please allow patient to sit throughout work to alleviate her pain from the constant pressure of standing. Patient will also complete at home stretches to manage her pain outside of work.     If you have any questions or concerns, please don't hesitate to call.      Claire Hannah PA-C

## 2024-06-02 ENCOUNTER — HEALTH MAINTENANCE LETTER (OUTPATIENT)
Age: 54
End: 2024-06-02

## 2024-09-24 ENCOUNTER — OFFICE VISIT (OUTPATIENT)
Dept: FAMILY MEDICINE | Facility: CLINIC | Age: 54
End: 2024-09-24
Payer: COMMERCIAL

## 2024-09-24 ENCOUNTER — ORDERS ONLY (AUTO-RELEASED) (OUTPATIENT)
Dept: FAMILY MEDICINE | Facility: CLINIC | Age: 54
End: 2024-09-24

## 2024-09-24 VITALS
SYSTOLIC BLOOD PRESSURE: 115 MMHG | OXYGEN SATURATION: 100 % | BODY MASS INDEX: 31.65 KG/M2 | WEIGHT: 172 LBS | HEIGHT: 62 IN | TEMPERATURE: 97.5 F | DIASTOLIC BLOOD PRESSURE: 74 MMHG | RESPIRATION RATE: 17 BRPM | HEART RATE: 60 BPM

## 2024-09-24 DIAGNOSIS — Z13.1 DIABETES MELLITUS SCREENING: ICD-10-CM

## 2024-09-24 DIAGNOSIS — E78.2 MIXED HYPERLIPIDEMIA: ICD-10-CM

## 2024-09-24 DIAGNOSIS — Z12.11 SCREEN FOR COLON CANCER: ICD-10-CM

## 2024-09-24 DIAGNOSIS — E55.9 VITAMIN D DEFICIENCY: ICD-10-CM

## 2024-09-24 DIAGNOSIS — E66.811 CLASS 1 OBESITY WITHOUT SERIOUS COMORBIDITY WITH BODY MASS INDEX (BMI) OF 31.0 TO 31.9 IN ADULT, UNSPECIFIED OBESITY TYPE: ICD-10-CM

## 2024-09-24 DIAGNOSIS — E61.1 IRON DEFICIENCY: ICD-10-CM

## 2024-09-24 DIAGNOSIS — Z79.899 MEDICATION MANAGEMENT: ICD-10-CM

## 2024-09-24 DIAGNOSIS — Z12.31 VISIT FOR SCREENING MAMMOGRAM: ICD-10-CM

## 2024-09-24 DIAGNOSIS — Z00.00 ENCOUNTER FOR ROUTINE HISTORY AND PHYSICAL EXAM IN FEMALE: Primary | ICD-10-CM

## 2024-09-24 PROBLEM — M35.3 POLYMYALGIA (H): Status: RESOLVED | Noted: 2019-10-08 | Resolved: 2024-09-24

## 2024-09-24 LAB
ALBUMIN SERPL BCG-MCNC: 4.3 G/DL (ref 3.5–5.2)
ALBUMIN UR-MCNC: NEGATIVE MG/DL
ALP SERPL-CCNC: 78 U/L (ref 40–150)
ALT SERPL W P-5'-P-CCNC: 12 U/L (ref 0–50)
ANION GAP SERPL CALCULATED.3IONS-SCNC: 9 MMOL/L (ref 7–15)
APPEARANCE UR: CLEAR
AST SERPL W P-5'-P-CCNC: 25 U/L (ref 0–45)
BACTERIA #/AREA URNS HPF: ABNORMAL /HPF
BILIRUB SERPL-MCNC: 1.1 MG/DL
BILIRUB UR QL STRIP: NEGATIVE
BUN SERPL-MCNC: 10 MG/DL (ref 6–20)
CALCIUM SERPL-MCNC: 9.8 MG/DL (ref 8.8–10.4)
CHLORIDE SERPL-SCNC: 106 MMOL/L (ref 98–107)
CHOLEST SERPL-MCNC: 258 MG/DL
COLOR UR AUTO: YELLOW
CREAT SERPL-MCNC: 0.55 MG/DL (ref 0.51–0.95)
EGFRCR SERPLBLD CKD-EPI 2021: >90 ML/MIN/1.73M2
ERYTHROCYTE [DISTWIDTH] IN BLOOD BY AUTOMATED COUNT: 12.3 % (ref 10–15)
EST. AVERAGE GLUCOSE BLD GHB EST-MCNC: 105 MG/DL
FASTING STATUS PATIENT QL REPORTED: ABNORMAL
FASTING STATUS PATIENT QL REPORTED: NORMAL
FERRITIN SERPL-MCNC: 62 NG/ML (ref 11–328)
GLUCOSE SERPL-MCNC: 94 MG/DL (ref 70–99)
GLUCOSE UR STRIP-MCNC: NEGATIVE MG/DL
HBA1C MFR BLD: 5.3 % (ref 0–5.6)
HCO3 SERPL-SCNC: 27 MMOL/L (ref 22–29)
HCT VFR BLD AUTO: 40.6 % (ref 35–47)
HDLC SERPL-MCNC: 81 MG/DL
HGB BLD-MCNC: 13.6 G/DL (ref 11.7–15.7)
HGB UR QL STRIP: NEGATIVE
IRON BINDING CAPACITY (ROCHE): 263 UG/DL (ref 240–430)
IRON SATN MFR SERPL: 64 % (ref 15–46)
IRON SERPL-MCNC: 168 UG/DL (ref 37–145)
KETONES UR STRIP-MCNC: NEGATIVE MG/DL
LDLC SERPL CALC-MCNC: 166 MG/DL
LEUKOCYTE ESTERASE UR QL STRIP: ABNORMAL
MCH RBC QN AUTO: 31.1 PG (ref 26.5–33)
MCHC RBC AUTO-ENTMCNC: 33.5 G/DL (ref 31.5–36.5)
MCV RBC AUTO: 93 FL (ref 78–100)
MUCOUS THREADS #/AREA URNS LPF: PRESENT /LPF
NITRATE UR QL: NEGATIVE
NONHDLC SERPL-MCNC: 177 MG/DL
PH UR STRIP: 5.5 [PH] (ref 5–8)
PLATELET # BLD AUTO: 200 10E3/UL (ref 150–450)
POTASSIUM SERPL-SCNC: 4.1 MMOL/L (ref 3.4–5.3)
PROT SERPL-MCNC: 6.9 G/DL (ref 6.4–8.3)
RBC # BLD AUTO: 4.37 10E6/UL (ref 3.8–5.2)
RBC #/AREA URNS AUTO: ABNORMAL /HPF
SODIUM SERPL-SCNC: 142 MMOL/L (ref 135–145)
SP GR UR STRIP: >=1.03 (ref 1–1.03)
SQUAMOUS #/AREA URNS AUTO: ABNORMAL /LPF
TRIGL SERPL-MCNC: 57 MG/DL
TSH SERPL DL<=0.005 MIU/L-ACNC: 0.62 UIU/ML (ref 0.3–4.2)
UROBILINOGEN UR STRIP-ACNC: 0.2 E.U./DL
VIT D+METAB SERPL-MCNC: 27 NG/ML (ref 20–50)
WBC # BLD AUTO: 3.6 10E3/UL (ref 4–11)
WBC #/AREA URNS AUTO: ABNORMAL /HPF

## 2024-09-24 PROCEDURE — 85027 COMPLETE CBC AUTOMATED: CPT | Performed by: NURSE PRACTITIONER

## 2024-09-24 PROCEDURE — 80053 COMPREHEN METABOLIC PANEL: CPT | Performed by: NURSE PRACTITIONER

## 2024-09-24 PROCEDURE — 80061 LIPID PANEL: CPT | Performed by: NURSE PRACTITIONER

## 2024-09-24 PROCEDURE — 82728 ASSAY OF FERRITIN: CPT | Performed by: NURSE PRACTITIONER

## 2024-09-24 PROCEDURE — 83036 HEMOGLOBIN GLYCOSYLATED A1C: CPT | Performed by: NURSE PRACTITIONER

## 2024-09-24 PROCEDURE — 81001 URINALYSIS AUTO W/SCOPE: CPT | Performed by: NURSE PRACTITIONER

## 2024-09-24 PROCEDURE — 36415 COLL VENOUS BLD VENIPUNCTURE: CPT | Performed by: NURSE PRACTITIONER

## 2024-09-24 PROCEDURE — 83550 IRON BINDING TEST: CPT | Performed by: NURSE PRACTITIONER

## 2024-09-24 PROCEDURE — 99396 PREV VISIT EST AGE 40-64: CPT | Mod: 25 | Performed by: NURSE PRACTITIONER

## 2024-09-24 PROCEDURE — 90715 TDAP VACCINE 7 YRS/> IM: CPT | Performed by: NURSE PRACTITIONER

## 2024-09-24 PROCEDURE — 90471 IMMUNIZATION ADMIN: CPT | Performed by: NURSE PRACTITIONER

## 2024-09-24 PROCEDURE — 82306 VITAMIN D 25 HYDROXY: CPT | Performed by: NURSE PRACTITIONER

## 2024-09-24 PROCEDURE — 84443 ASSAY THYROID STIM HORMONE: CPT | Performed by: NURSE PRACTITIONER

## 2024-09-24 PROCEDURE — 83540 ASSAY OF IRON: CPT | Performed by: NURSE PRACTITIONER

## 2024-09-24 PROCEDURE — 87086 URINE CULTURE/COLONY COUNT: CPT | Performed by: NURSE PRACTITIONER

## 2024-09-24 ASSESSMENT — PAIN SCALES - GENERAL: PAINLEVEL: NO PAIN (0)

## 2024-09-24 NOTE — PROGRESS NOTES
Assessment and Plan:    Encounter for routine history and physical exam in female  Recommend consuming a healthy diet and exercising.  She declines shingles, COVID, influenza vaccines.  Tetanus vaccine provided.  Mammogram and Cologuard ordered.  She will follow-up for Pap smear.  - CBC with platelets  - TSH with free T4 reflex  - UA Macroscopic with reflex to Microscopic and Culture    Diabetes mellitus screening  - Hemoglobin A1c    Screen for colon cancer  - COLOGUARD([employer])    Visit for screening mammogram  - MA Screening Bilateral w/ George    Mixed hyperlipidemia  Will check lipid cascade.  She is not currently taking medication.  - Lipid panel reflex to direct LDL Non-fasting  - Lipid panel reflex to direct LDL Fasting    Iron deficiency  Patient has a history of iron deficiency.  Patient takes a daily multivitamin.  - Ferritin  - Iron and iron binding capacity    Vitamin D deficiency  Patient takes a daily multivitamin.  - Vitamin D Deficiency    Medication management  - Comprehensive metabolic panel    Class 1 obesity without serious comorbidity with body mass index (BMI) of 31.0 to 31.9 in adult, unspecified obesity type  Recommend consuming a healthy diet and exercising.  This is contributing to hyperlipidemia.      Subjective:     Ana Cristina is a 54 year old female presenting to the clinic for a female physical.     LMP: last Thursday, irregular (skipping months)  Hx of abnormal pap smear: none  Last pap smear: 10/29/19  Perform self-breast exams: none   Vaginal discharge or irritation: none  Sexually active: yes,  for 27 years   Contraception: none   Concerns for STDs: none  Previous pregnancies:four pregnancies (1 miscarriage, 2 vaginal deliveries and 1 )   Kids are 29, 25, and 13 (girls)     She has a history of hyperlipidemia and is not currently taking medication.  Patient has a history of vitamin D deficiency and iron deficiency.  She is not taking supplementation.  She has been  experiencing some intermittent fatigue and dizziness.    Review of systems:  I performed a 10 point review of systems.  All pertinent positives and negatives are noted in the HPI. All others are negative.     No Known Allergies    Current Outpatient Medications   Medication Sig Dispense Refill    acetaminophen (TYLENOL) 500 MG tablet Take 2 tablets (1,000 mg) by mouth every 6 hours as needed for pain 100 tablet 1     No current facility-administered medications for this visit.       Social History     Socioeconomic History    Marital status:      Spouse name: Not on file    Number of children: Not on file    Years of education: Not on file    Highest education level: Not on file   Occupational History    Not on file   Tobacco Use    Smoking status: Never     Passive exposure: Never    Smokeless tobacco: Never   Vaping Use    Vaping status: Never Used   Substance and Sexual Activity    Alcohol use: No    Drug use: No    Sexual activity: Not Currently     Partners: Male     Birth control/protection: None   Other Topics Concern    Not on file   Social History Narrative    Patient is right hand dominant.      Social Determinants of Health     Financial Resource Strain: Low Risk  (9/24/2024)    Financial Resource Strain     Within the past 12 months, have you or your family members you live with been unable to get utilities (heat, electricity) when it was really needed?: No   Food Insecurity: Low Risk  (9/24/2024)    Food Insecurity     Within the past 12 months, did you worry that your food would run out before you got money to buy more?: No     Within the past 12 months, did the food you bought just not last and you didn t have money to get more?: Patient declined   Transportation Needs: Low Risk  (9/24/2024)    Transportation Needs     Within the past 12 months, has lack of transportation kept you from medical appointments, getting your medicines, non-medical meetings or appointments, work, or from getting  things that you need?: No   Physical Activity: Unknown (9/24/2024)    Exercise Vital Sign     Days of Exercise per Week: 3 days     Minutes of Exercise per Session: Not on file   Stress: No Stress Concern Present (9/24/2024)    [employer] [place] [employer] - Occupational Stress Questionnaire     Feeling of Stress : Not at all   Social Connections: Unknown (9/24/2024)    Social Connection and Isolation Panel [NHANES]     Frequency of Communication with Friends and Family: Not on file     Frequency of Social Gatherings with Friends and Family: Once a week     Attends Druze Services: Not on file     Active Member of Clubs or Organizations: Not on file     Attends Club or Organization Meetings: Not on file     Marital Status: Not on file   Interpersonal Safety: Low Risk  (9/24/2024)    Interpersonal Safety     Do you feel physically and emotionally safe where you currently live?: Yes     Within the past 12 months, have you been hit, slapped, kicked or otherwise physically hurt by someone?: No     Within the past 12 months, have you been humiliated or emotionally abused in other ways by your partner or ex-partner?: No   Housing Stability: Low Risk  (9/24/2024)    Housing Stability     Do you have housing? : Patient declined     Are you worried about losing your housing?: No       Past Medical History:   Diagnosis Date    Calculus of gallbladder 5/20/2014    Overview:  Problem list name updated by automated process. Provider to review    Cervical disc herniation 4/29/2016    Esophageal reflux     Created by Conversion     Gastritis and gastroduodenitis 9/4/2012    History of gastritis due to H. pylori Problem list name updated by automated process. Provider to review    Iron deficiency 10/8/2019    Migraine with aura and without status migrainosus, not intractable 10/8/2019    Mixed hyperlipidemia     Hyperlipidemia    Other and unspecified hyperlipidemia     Created by Conversion     Polymyalgia (H24)  "10/8/2019    Unspecified arthropathy, shoulder region     Created by Conversion     Vitamin D deficiency     Created by Conversion  Replacement Utility updated for latest IMO load       Family History   Problem Relation Age of Onset    No Known Problems Mother     No Known Problems Father     No Known Problems Sister     No Known Problems Brother     No Known Problems Brother     No Known Problems Brother     No Known Problems Brother     No Known Problems Brother     Breast Cancer No family hx of     Colon Cancer No family hx of     Diabetes No family hx of     Coronary Artery Disease No family hx of        Past Surgical History:   Procedure Laterality Date     SECTION  2011    Procedure: SECTION; Surgeon:THUAN CAO Location:[facility]       Objective:     /74   Pulse 60   Temp 97.5  F (36.4  C)   Resp 17   Ht 1.575 m (5' 2\")   Wt 78 kg (172 lb)   LMP 2024   SpO2 100%   Breastfeeding No   BMI 31.46 kg/m      Patient is alert, no obvious distress.   Skin: Warm, dry.  No rashes or lesions. Skin turgor rapid return.   HEENT:  Eyes normal.  Ears normal.  Nose patent, mucosa pink.  Oropharynx mucosa pink, no lesions or tonsil enlargement.   Neck:  Supple, without lymphadenopathy, bruits, JVD. Thyroid normal texture and size.    Lungs:  Clear to auscultation.  No wheezing, rales noted.  Respirations even and unlabored.   Heart:  Regular rate and rhythm.  No murmurs.   Breasts:  Normal.  No surrounding adenopathy.   Abdomen: Soft, nontender.  No organomegaly.  Bowel sounds normoactive.  No guarding or masses noted.   :  deferred per patient   Musculoskeletal:  Full ROM of extremities.  Muscle strength equal +5/5.   Neurological:  Cranial nerves 2-12 intact.              "

## 2024-09-25 LAB — BACTERIA UR CULT: NORMAL

## 2024-11-22 ENCOUNTER — MYC MEDICAL ADVICE (OUTPATIENT)
Dept: FAMILY MEDICINE | Facility: CLINIC | Age: 54
End: 2024-11-22

## 2024-11-22 NOTE — LETTER
January 20, 2025      Ana Cristina Mc  [address]     [facility] [facility] [address]            Dear Ana Cristina      In reviewing your records, we have identified a gap in your preventive services. Based on your age and health history, we recommend the following services.     Mammogram     Please call [phone] to schedule a mammogram.    If you have already completed any of the above services please let me know the date the service was completed and where it was completed so we can request these records for your provider to review.     We believe that a strong preventive care program, including regular physical and follow-up healthcare appointments are an important part of a healthy lifestyle and we are committed to helping you maintain your health.     Thank you for choosing us as your health care provider.     Sincerely,      DARREN YoungerN, RN  [facility]

## 2024-12-19 ENCOUNTER — MYC MEDICAL ADVICE (OUTPATIENT)
Dept: FAMILY MEDICINE | Facility: CLINIC | Age: 54
End: 2024-12-19

## 2024-12-19 NOTE — LETTER
January 28, 2025      Ana Cristina Mc  [address]     [place] [facility] [address]        Dear Ana Cristina      In reviewing your records, we have identified a gap in your preventive services. Based on your age and health history, we recommend the following services.     Cervical cancer screening (PAP Smear)     Please call [phone] to schedule a cervical cancer screening (PAP smear)     If you have already completed any of the above services please let me know the date the service was completed and where it was completed so we can request these records for your provider to review.     We believe that a strong preventive care program, including regular physical and follow-up healthcare appointments are an important part of a healthy lifestyle and we are committed to helping you maintain your health.     Thank you for choosing us as your health care provider.     Sincerely,      Debbie Babcock, DARRENN, RN  [facility]

## 2025-01-10 ENCOUNTER — ANCILLARY PROCEDURE (OUTPATIENT)
Dept: GENERAL RADIOLOGY | Facility: CLINIC | Age: 55
End: 2025-01-10
Attending: NURSE PRACTITIONER
Payer: COMMERCIAL

## 2025-01-10 DIAGNOSIS — M25.562 ACUTE PAIN OF LEFT KNEE: ICD-10-CM

## 2025-01-10 PROCEDURE — 73562 X-RAY EXAM OF KNEE 3: CPT | Mod: TC | Performed by: RADIOLOGY

## 2025-01-20 NOTE — TELEPHONE ENCOUNTER
Patient Quality Outreach    Patient is due for the following:   Breast Cancer Screening - Mammogram    Action(s) Taken:   Patient to schedule mammogram     Type of outreach:    Sent [employer] message. and Sent letter.    Questions for provider review:    None           Debbie Babcock RN

## 2025-01-28 NOTE — TELEPHONE ENCOUNTER
Patient Quality Outreach    Patient is due for the following:   Cervical Cancer Screening - PAP Needed    Action(s) Taken:   Schedule a office visit for PAP    Type of outreach:    Sent [employer] message. and Sent letter.    Questions for provider review:    None           Debbie Babcock RN

## 2025-06-15 ENCOUNTER — HEALTH MAINTENANCE LETTER (OUTPATIENT)
Age: 55
End: 2025-06-15

## 2025-07-17 ENCOUNTER — PATIENT OUTREACH (OUTPATIENT)
Dept: FAMILY MEDICINE | Facility: CLINIC | Age: 55
End: 2025-07-17
Payer: COMMERCIAL

## 2025-07-17 NOTE — LETTER
July 17, 2025      Ana Cristina Mc  [address]     [facility] [address]          Dear Ana Cristina      In reviewing your records, we have identified a gap in your preventive services. Based on your age and health history, we recommend the following services.     Annual Preventative Visit , Due 9/25/25 or later. Establish care with a provider.     Please call [phone] to schedule an annual preventative visit     If you have already completed any of the above services please let me know the date the service was completed and where it was completed so we can request these records for your provider to review.     We believe that a strong preventive care program, including regular physical and follow-up healthcare appointments are an important part of a healthy lifestyle and we are committed to helping you maintain your health.     Thank you for choosing us as your health care provider.     Sincerely,   [facility]

## 2025-07-17 NOTE — TELEPHONE ENCOUNTER
Patient Quality Outreach    Patient is due for the following:   Physical Preventive Adult Physical    Action(s) Taken:   Schedule a Adult Preventative    Type of outreach:    Sent letter.    Questions for provider review:    None         Dimitris Magana RN

## 2025-08-25 ENCOUNTER — PATIENT OUTREACH (OUTPATIENT)
Dept: CARE COORDINATION | Facility: CLINIC | Age: 55
End: 2025-08-25
Payer: COMMERCIAL